# Patient Record
Sex: FEMALE | Race: WHITE | Employment: UNEMPLOYED | URBAN - METROPOLITAN AREA
[De-identification: names, ages, dates, MRNs, and addresses within clinical notes are randomized per-mention and may not be internally consistent; named-entity substitution may affect disease eponyms.]

---
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
## 2021-07-21 ENCOUNTER — OFFICE VISIT (OUTPATIENT)
Dept: URGENT CARE | Age: 21
End: 2021-07-21

## 2021-07-21 VITALS
SYSTOLIC BLOOD PRESSURE: 170 MMHG | RESPIRATION RATE: 20 BRPM | WEIGHT: 213 LBS | OXYGEN SATURATION: 98 % | BODY MASS INDEX: 33.36 KG/M2 | TEMPERATURE: 98.7 F | DIASTOLIC BLOOD PRESSURE: 89 MMHG | HEART RATE: 89 BPM

## 2021-07-21 DIAGNOSIS — Z86.59 HISTORY OF POSTTRAUMATIC STRESS DISORDER (PTSD): ICD-10-CM

## 2021-07-21 DIAGNOSIS — F41.9 ACUTE ANXIETY: ICD-10-CM

## 2021-07-21 DIAGNOSIS — R53.83 FATIGUE, UNSPECIFIED TYPE: Primary | ICD-10-CM

## 2021-07-21 LAB — SARS-COV-2 POC: NEGATIVE

## 2021-07-21 PROCEDURE — 96372 THER/PROPH/DIAG INJ SC/IM: CPT

## 2021-07-21 PROCEDURE — 99204 OFFICE O/P NEW MOD 45 MIN: CPT | Performed by: FAMILY MEDICINE

## 2021-07-21 PROCEDURE — 87426 SARSCOV CORONAVIRUS AG IA: CPT | Performed by: FAMILY MEDICINE

## 2021-07-21 RX ORDER — PAROXETINE HYDROCHLORIDE HEMIHYDRATE 25 MG/1
25 TABLET, FILM COATED, EXTENDED RELEASE ORAL DAILY
COMMUNITY

## 2021-07-21 RX ORDER — ONDANSETRON 2 MG/ML
4 INJECTION INTRAMUSCULAR; INTRAVENOUS ONCE
Status: COMPLETED | OUTPATIENT
Start: 2021-07-21 | End: 2021-07-21

## 2021-07-21 RX ORDER — HYDROXYZINE PAMOATE 25 MG/1
25 CAPSULE ORAL
Qty: 20 CAPSULE | Refills: 0 | Status: SHIPPED | OUTPATIENT
Start: 2021-07-21

## 2021-07-21 RX ORDER — ONDANSETRON 4 MG/1
4 TABLET, ORALLY DISINTEGRATING ORAL
Qty: 6 TABLET | Refills: 0 | Status: SHIPPED | OUTPATIENT
Start: 2021-07-21

## 2021-07-21 RX ORDER — PROPRANOLOL HYDROCHLORIDE 20 MG/1
20 TABLET ORAL
Qty: 20 TABLET | Refills: 0 | Status: SHIPPED | OUTPATIENT
Start: 2021-07-21

## 2021-07-21 RX ADMIN — ONDANSETRON 4 MG: 2 INJECTION INTRAMUSCULAR; INTRAVENOUS at 16:30

## 2021-07-21 NOTE — LETTER
NOTIFICATION RETURN TO WORK / SCHOOL    7/21/2021 5:12 PM    Ms. Desiree Perez  [address] [id number]      To Whom It May Concern:    Desiree Perez is currently under the care of [facility]. She will return to work/school on: 7/26/21    If there are questions or concerns please have the patient contact our office.         Sincerely,      Chio Whitley MD

## 2021-07-21 NOTE — PATIENT INSTRUCTIONS
Hyperventilation: Care Instructions  Your Care Instructions  Hyperventilation is breathing that is deeper and faster than normal. It causes the amount of carbon dioxide (CO2) in the blood to drop. This may make you feel lightheaded. You may also have a fast heartbeat and feel short of breath. It also can lead to numbness or tingling in the hands or feet, anxiety, fainting, and sore chest muscles. Some causes of sudden hyperventilation include anxiety, asthma, emphysema, a head injury, fever, and some medicines. Hyperventilation also may be caused by a pattern of incorrect breathing. It most often happens when a physical or emotional event makes this breathing pattern worse. Hyperventilation may happen during pregnancy. But it usually goes away on its own after delivery. In many cases, hyperventilation can be controlled by learning proper breathing techniques. Follow-up care is a key part of your treatment and safety. Be sure to make and go to all appointments, and call your doctor if you are having problems. It's also a good idea to know your test results and keep a list of the medicines you take. How can you care for yourself at home? Breathing methods  · Breathe through pursed lips, as if you are whistling. Or pinch one nostril and breathe through your nose. It is harder to hyperventilate through your nose or through pursed lips because you can't move as much air. · Slow your breathing to 1 breath every 5 seconds, or slow enough that symptoms gradually go away. · Try belly-breathing. This fills your lungs fully, slows your breathing rate, and helps you relax. ? Place one hand on your belly just below the ribs. Place the other hand on your chest. You can do this while standing, but it may be more comfortable while you lie on the floor with your knees bent. ? Take a deep breath through your nose. As you breathe in, let your belly push your hand out. Keep your chest still.   ? As you breathe out through pursed lips, feel your hand go down. Use the hand on your belly to help you push all the air out. Take your time breathing out. ? Repeat these steps 3 to 10 times. Take your time with each breath. Always try to control your breathing or to belly-breathe first. If these techniques don't work and you don't have other health problems, you might try breathing in and out of a paper bag. Using a paper bag  · Take 6 to 12 easy, natural breaths, with a small paper bag held over your mouth and nose. Then remove the bag from your nose and mouth, and take easy, natural breaths. · Next, try belly-breathing. · Switch between these techniques until your hyperventilation stops. Do not try this method if:  · You have any heart or lung problems. · Rapid breathing happens at a high altitude. Breathing faster than normal is a natural response to high altitude. Follow these safety measures when using this method:  · Do not use a plastic bag. · Do not breathe continuously into a paper bag. Take 6 to 12 natural breaths with a paper bag held over your mouth and nose. Then remove the bag from your nose and mouth. · Do not hold the bag for a person who is hyperventilating. Let the person hold the bag over his or her own mouth and nose. When should you call for help? Call 911 anytime you think you may need emergency care. For example, call if:    · You passed out (lost consciousness). Call your doctor now or seek immediate medical care if:    · You hyperventilate for longer than 30 minutes.     · You hyperventilate often.     · Your symptoms do not improve with home treatment.     · Your symptoms become more severe or more frequent. Watch closely for changes in your health, and be sure to contact your doctor if you have any problems. Where can you learn more? Go to http://www.gray.com/  Enter [id number] in the search box to learn more about \"Hyperventilation: Care Instructions. \"  Current as of: February 26, 2020               Content Version: 12.8  © 2006-2021 [employer]. Care instructions adapted under license by [employer] (which disclaims liability or warranty for this information). If you have questions about a medical condition or this instruction, always ask your healthcare professional. [address] any warranty or liability for your use of this information. Panic Attacks: Care Instructions  Overview    During a panic attack, you may have a feeling of intense fear or terror, trouble breathing, chest pain or tightness, heartbeat changes, dizziness, sweating, and shaking. A panic attack starts suddenly and usually lasts from 5 to 20 minutes but may last even longer. An attack can begin with a stressful event. Or it can happen without a cause. Although panic attacks can cause scary symptoms, you can learn to manage them with self-care, counseling, and medicine. Follow-up care is a key part of your treatment and safety. Be sure to make and go to all appointments, and call your doctor if you are having problems. It's also a good idea to know your test results and keep a list of the medicines you take. How can you care for yourself at home? · Take your medicine exactly as directed. Call your doctor if you think you are having a problem with your medicine. · Go to your counseling sessions and follow-up appointments. · Recognize and accept your anxiety. Then, when you are in a situation that makes you anxious, say to yourself, \"This is not an emergency. I feel uncomfortable, but I am not in danger. I can keep going even if I feel anxious. \"  · Be kind to your body:  ? Relieve tension with exercise or a massage. ? Get enough rest.  ? Avoid alcohol, caffeine, nicotine, and illegal drugs. They can increase your anxiety level, cause sleep problems, or trigger a panic attack. ? Learn and do relaxation techniques.  See below for more about these techniques. · Engage your mind. Get out and do something you enjoy. Go to a funny movie, or take a walk or hike. Plan your day. Having too much or too little to do can make you anxious. · Keep a record of your symptoms. Discuss your fears with a good friend or family member, or join a support group for people with similar problems. Talking to others sometimes relieves stress. · Get involved in social groups, or volunteer to help others. Being alone sometimes makes things seem worse than they are. · Get at least 30 minutes of exercise on most days of the week to relieve stress. Walking is a good choice. You also may want to do other activities, such as running, swimming, cycling, or playing tennis or team sports. Relaxation techniques  Do relaxation exercises for 10 to 20 minutes a day. You can play soothing, relaxing music while you do them, if you wish. · Tell others in your house that you are going to do your relaxation exercises. Ask them not to disturb you. · Find a comfortable place, away from all distractions and noise. · Lie down on your back, or sit with your back straight. · Focus on your breathing. Make it slow and steady. · Breathe in through your nose. Breathe out through either your nose or mouth. · Breathe deeply, filling up the area between your navel and your rib cage. Breathe so that your belly goes up and down. · Do not hold your breath. · Breathe like this for 5 to 10 minutes. Notice the feeling of calmness throughout your whole body. As you continue to breathe slowly and deeply, relax by doing the following for another 5 to 10 minutes:  · Tighten and relax each muscle group in your body. You can begin at your toes and work your way up to your head. · Imagine your muscle groups relaxing and becoming heavy. · Empty your mind of all thoughts. · Let yourself relax more and more deeply. · Become aware of the state of calmness that surrounds you.   · When your relaxation time is over, you can bring yourself back to alertness by moving your fingers and toes and then your hands and feet and then stretching and moving your entire body. Sometimes people fall asleep during relaxation, but they usually wake up shortly afterward. · Always give yourself time to return to full alertness before you drive a car or do anything that might cause an accident if you are not fully alert. Never play a relaxation tape while driving a car. When should you call for help? Call 911 anytime you think you may need emergency care. For example, call if:    · You feel you cannot stop from hurting yourself or someone else. Watch closely for changes in your health, and be sure to contact your doctor if:    · Your panic attacks get worse.     · You have new or different anxiety.     · You are not getting better as expected. Where can you learn more? Go to http://www.gray.com/  Enter H601 in the search box to learn more about \"Panic Attacks: Care Instructions. \"  Current as of: September 23, 2020               Content Version: 12.8  © 2006-2021 [employer]. Care instructions adapted under license by [employer] (which disclaims liability or warranty for this information). If you have questions about a medical condition or this instruction, always ask your healthcare professional. [address] any warranty or liability for your use of this information.

## 2021-07-21 NOTE — PROGRESS NOTES
Anxiety  This is a new problem. The current episode started 12 to 24 hours ago. The problem occurs constantly. The problem has not changed since onset. Associated symptoms include chest pain, headaches and shortness of breath. Associated symptoms comments: See ROS. The symptoms are aggravated by stress (her exacerbation of PTSD sxs). Nothing relieves the symptoms. Treatments tried: went to ED @ [facility] yesterday and given Iv ativan there and it helped. Past Medical History:   Diagnosis Date    Psychiatric disorder     depression and anxiety        History reviewed. No pertinent surgical history. Family History   Problem Relation Age of Onset    No Known Problems Mother     No Known Problems Father         Social History     Socioeconomic History    Marital status: SINGLE     Spouse name: Not on file    Number of children: Not on file    Years of education: Not on file    Highest education level: Not on file   Occupational History    Not on file   Tobacco Use    Smoking status: Never Smoker    Smokeless tobacco: Never Used   Substance and Sexual Activity    Alcohol use: Not on file    Drug use: Not on file    Sexual activity: Not on file   Other Topics Concern    Not on file   Social History Narrative    Not on file     Social Determinants of Health     Financial Resource Strain:     Difficulty of Paying Living Expenses:    Food Insecurity:     Worried About Running Out of Food in the Last Year:     [address] in the Last Year:    Transportation Needs:     Lack of Transportation (Medical):      Lack of Transportation (Non-Medical):    Physical Activity:     Days of Exercise per Week:     Minutes of Exercise per Session:    Stress:     Feeling of Stress :    Social Connections:     Frequency of Communication with Friends and Family:     Frequency of Social Gatherings with Friends and Family:     Attends Oriental orthodox Services:     Active Member of Clubs or Organizations:     Attends Club or Organization Meetings:     Marital Status:    Intimate Partner Violence:     Fear of Current or Ex-Partner:     Emotionally Abused:     Physically Abused:     Sexually Abused: ALLERGIES: Patient has no known allergies. Review of Systems   Constitutional: Positive for fatigue. Negative for chills and fever. Respiratory: Positive for chest tightness and shortness of breath. Negative for wheezing. Cardiovascular: Positive for chest pain and palpitations. Skin:        Cold hands and feet    Neurological: Positive for weakness (feels npo energy to do anything ), light-headedness, numbness and headaches. Psychiatric/Behavioral: Positive for sleep disturbance. The patient is nervous/anxious. Vitals:    07/21/21 1554   BP: (!) 170/89   Pulse: 89   Resp: 20   Temp: 98.7 °F (37.1 °C)   SpO2: 98%   Weight: 213 lb (96.6 kg)       Physical Exam  Vitals and nursing note reviewed. Constitutional:       General: She is not in acute distress. Appearance: Normal appearance. She is not ill-appearing, toxic-appearing or diaphoretic. HENT:      Right Ear: Tympanic membrane normal.      Left Ear: Tympanic membrane normal.      Nose: No congestion or rhinorrhea. Mouth/Throat:      Pharynx: No oropharyngeal exudate or posterior oropharyngeal erythema. Eyes:      Conjunctiva/sclera: Conjunctivae normal.   Cardiovascular:      Rate and Rhythm: Regular rhythm. Tachycardia present. Pulses: Normal pulses. Heart sounds: Normal heart sounds. Pulmonary:      Effort: Pulmonary effort is normal. No respiratory distress. Breath sounds: Normal breath sounds. No wheezing, rhonchi or rales. Chest:      Chest wall: No tenderness. Abdominal:      General: Abdomen is flat. Tenderness: There is no abdominal tenderness. Musculoskeletal:      Right lower leg: No edema. Left lower leg: No edema. Neurological:      General: No focal deficit present. Mental Status: She is alert and oriented to person, place, and time. Cranial Nerves: No cranial nerve deficit. Coordination: Coordination normal.      Gait: Abnormal gait: not tested in wheel chair due to generalized weakness- no neuro deficit    Psychiatric:         Attention and Perception: Attention normal.         Mood and Affect: Mood is anxious. Behavior: Behavior is cooperative. MDM    Procedures        ICD-10-CM ICD-9-CM    1. Fatigue, unspecified type  R53.83 780.79 AMB POC SARS-COV-2   2. Acute anxiety  F41.9 300.00     since yesterday    3. History of posttraumatic stress disorder (PTSD)  Z86.59 V11.8        felt better when talk with her explain about her symptoms and hyperventilation creating vicious cycle  Advised to breath in [paper bag @ home  Start meditation 10 min twice a day  Follow with PCP/ psychiatrist/ Therapist for erliest follow up personal/ virtual- may benefit with increasing paxil      She also feel much after getting IVF  Advised to rest and back to work from Monday       Medications Ordered Today   Medications    sodium chloride 0.9 % bolus infusion 1,000 mL    ondansetron (ZOFRAN) injection 4 mg    hydrOXYzine pamoate (VISTARIL) 25 mg capsule     Sig: Take 1 Capsule by mouth four (4) times daily as needed for Anxiety (Panic attack). Dispense:  20 Capsule     Refill:  0    propranoloL (INDERAL) 20 mg tablet     Sig: Take 1 Tablet by mouth four (4) times daily as needed (anxiety/ Palpitation). Dispense:  20 Tablet     Refill:  0    ondansetron (Zofran ODT) 4 mg disintegrating tablet     Sig: Take 1 Tablet by mouth every eight (8) hours as needed for Nausea. Dispense:  6 Tablet     Refill:  0     Results for orders placed or performed in visit on 07/21/21   AMB POC SARS-COV-2   Result Value Ref Range    SARS-COV-2 POC Negative Negative     The patients condition was discussed with the patient and they understand.   The patient is to follow up with primary care doctor. If signs and symptoms become worse the pt is to go to the ER. The patient is to take medications as prescribed.

## 2021-07-22 ENCOUNTER — PATIENT MESSAGE (OUTPATIENT)
Dept: URGENT CARE | Age: 21
End: 2021-07-22

## 2021-07-22 NOTE — TELEPHONE ENCOUNTER
Pt left v/m she wanted a copy of her covid results emailed to her. Returned patient call no answer, left msg that I sent her mychart activation to her text message.

## 2023-01-18 ENCOUNTER — OFFICE VISIT (OUTPATIENT)
Dept: PRIMARY CARE CLINIC | Age: 23
End: 2023-01-18

## 2023-01-18 VITALS
DIASTOLIC BLOOD PRESSURE: 66 MMHG | BODY MASS INDEX: 24.17 KG/M2 | HEIGHT: 67 IN | RESPIRATION RATE: 16 BRPM | TEMPERATURE: 98.9 F | OXYGEN SATURATION: 97 % | SYSTOLIC BLOOD PRESSURE: 113 MMHG | WEIGHT: 154 LBS | HEART RATE: 111 BPM

## 2023-01-18 DIAGNOSIS — B37.9 ANTIBIOTIC-INDUCED YEAST INFECTION: ICD-10-CM

## 2023-01-18 DIAGNOSIS — J18.9 WALKING PNEUMONIA: Primary | ICD-10-CM

## 2023-01-18 DIAGNOSIS — T36.95XA ANTIBIOTIC-INDUCED YEAST INFECTION: ICD-10-CM

## 2023-01-18 DIAGNOSIS — R68.89 FLU-LIKE SYMPTOMS: ICD-10-CM

## 2023-01-18 LAB
FLUAV+FLUBV AG NOSE QL IA.RAPID: NEGATIVE
FLUAV+FLUBV AG NOSE QL IA.RAPID: POSITIVE
VALID INTERNAL CONTROL?: YES

## 2023-01-18 PROCEDURE — 87804 INFLUENZA ASSAY W/OPTIC: CPT

## 2023-01-18 PROCEDURE — 99204 OFFICE O/P NEW MOD 45 MIN: CPT

## 2023-01-18 RX ORDER — PROMETHAZINE HYDROCHLORIDE AND DEXTROMETHORPHAN HYDROBROMIDE 6.25; 15 MG/5ML; MG/5ML
5 SYRUP ORAL
Qty: 120 ML | Refills: 0 | Status: SHIPPED | OUTPATIENT
Start: 2023-01-18 | End: 2023-01-25

## 2023-01-18 RX ORDER — FLUCONAZOLE 150 MG/1
150 TABLET ORAL DAILY
Qty: 1 TABLET | Refills: 0 | Status: SHIPPED | OUTPATIENT
Start: 2023-01-18 | End: 2023-01-19

## 2023-01-18 RX ORDER — DOXYCYCLINE 100 MG/1
100 CAPSULE ORAL 2 TIMES DAILY
Qty: 14 CAPSULE | Refills: 0 | Status: SHIPPED | OUTPATIENT
Start: 2023-01-18 | End: 2023-01-25

## 2023-01-18 RX ORDER — AMOXICILLIN 875 MG/1
875 TABLET, FILM COATED ORAL 2 TIMES DAILY
COMMUNITY
Start: 2023-01-10

## 2023-01-18 RX ORDER — METHYLPREDNISOLONE 4 MG/1
TABLET ORAL
Qty: 1 DOSE PACK | Refills: 0 | Status: SHIPPED | OUTPATIENT
Start: 2023-01-18

## 2023-01-18 NOTE — LETTER
NOTIFICATION RETURN TO WORK / SCHOOL    1/18/2023 7:15 PM    Ms. Godfrey Eisenmenger  [address] [address]      To Whom It May Concern:    Godfrey Eisenmenger is currently under the care of [facility]. She will return to work/school once 24 hours free of symptoms/fever. If there are questions or concerns please have the patient contact our office.         Sincerely,      Eloise Henson NP

## 2023-01-18 NOTE — PROGRESS NOTES
Chief Complaint   Patient presents with    Cold Symptoms     X1 week; seen 1/12/2023 dx sinus inf and given abx; congestion/cough/sore throat/body aches/fever(100)/chills/ears popping/headache; home covid today - neg; taking amox    Yeast Infection     HISTORY OF PRESENT ILLNESS  Manas Knott is a 25 y.o. female. Patient reports coughing that's keeping her up at night, nasal congestion, sore throat, pressure in ears x2 weeks that's worsening. Now having mild SOB, right lower ribcage pain, low grade fever/chills/sweats. [facility] and given amox[facility]. In addition to symptoms now having yeast infection that began after antibiotics. Reports taking mucinex (intermittently) with little improvement in symptoms. Review of Systems   Constitutional:  Positive for malaise/fatigue. Negative for chills and fever. HENT:  Positive for congestion, sinus pain and sore throat. Negative for ear discharge. Eyes: Negative. Respiratory:  Positive for cough, sputum production and shortness of breath. Cardiovascular: Negative. Gastrointestinal: Negative. Genitourinary: Negative. Musculoskeletal: Negative. Skin: Negative. Neurological:  Negative for loss of consciousness. Endo/Heme/Allergies: Negative. Psychiatric/Behavioral: Negative. Physical Exam  Constitutional:       Appearance: Normal appearance. She is normal weight. She is ill-appearing. HENT:      Head: Normocephalic. Right Ear: A middle ear effusion is present. Left Ear: A middle ear effusion is present. Nose: Congestion present. No rhinorrhea. Mouth/Throat:      Mouth: Mucous membranes are moist.      Pharynx: Posterior oropharyngeal erythema present. No oropharyngeal exudate. Eyes:      Pupils: Pupils are equal, round, and reactive to light. Cardiovascular:      Rate and Rhythm: Normal rate. Pulses: Normal pulses. Heart sounds: Normal heart sounds.    Pulmonary:      Effort: Pulmonary effort is normal.      Breath sounds: Examination of the right-lower field reveals decreased breath sounds. Decreased breath sounds present. Comments: Clear breath sounds throughout lungs, diminished sounds to right lower posterior lobe. Abdominal:      Palpations: Abdomen is soft. Musculoskeletal:         General: Normal range of motion. Cervical back: Normal range of motion and neck supple. Skin:     General: Skin is warm. Neurological:      General: No focal deficit present. Mental Status: She is alert and oriented to person, place, and time. Psychiatric:         Mood and Affect: Mood normal.         Behavior: Behavior normal.     Past Medical History:   Diagnosis Date    Psychiatric disorder     depression and anxiety     History reviewed. No pertinent surgical history. /66 (BP 1 Location: Left arm, BP Patient Position: Sitting)   Pulse (!) 111   Temp 98.9 °F (37.2 °C) (Temporal)   Resp 16   Ht 5' 7\" (1.702 m)   Wt 154 lb (69.9 kg)   SpO2 97%   BMI 24.12 kg/m²   Results for orders placed or performed in visit on 01/18/23   AMB POC WILLIE INFLUENZA A/B TEST   Result Value Ref Range    VALID INTERNAL CONTROL POC Yes     Influenza A Ag POC Positive (A) Negative    Influenza B Ag POC Negative Negative     ASSESSMENT and PLAN  1. Walking pneumonia  -     XR CHEST PA LAT; Future  -     doxycycline (VIBRAMYCIN) 100 mg capsule; Take 1 Capsule by mouth two (2) times a day for 7 days. , Normal, Disp-14 Capsule, R-0 - Reports amoxicillin has not helped symptoms of sinus pressure/pain. Advised to stop taking previous abx and start taking doxy. -     methylPREDNISolone (MEDROL DOSEPACK) 4 mg tablet; Take as directed., Normal, Disp-1 Dose Pack, R-0  -     promethazine-dextromethorphan (PROMETHAZINE-DM) 6.25-15 mg/5 mL syrup; Take 5 mL by mouth every four (4) hours as needed for Cough for up to 7 days. , Normal, Disp-120 mL, R-0  - Patient passed ambulatory pulse ox remained above >95%.   - Advised patient to be evaluated in ER,patient declined. Discussed if symptoms does not improve in the next 24 hours to go to ER. Patient in agreement with plan  2. Flu-like symptoms  -     AMB POC WILLIE INFLUENZA A/B TEST   - Rest/push fluids  3. Antibiotic-induced yeast infection  -     fluconazole (DIFLUCAN) 150 mg tablet; Take 1 Tablet by mouth daily for 1 day. FDA advises cautious prescribing of oral fluconazole in pregnancy. , Normal, Disp-1 Tablet, R-0    Eloise Henson NP

## 2023-01-18 NOTE — PROGRESS NOTES
Identified pt with two pt identifiers(name and ). Reviewed record in preparation for visit and have obtained necessary documentation. Chief Complaint   Patient presents with    Cold Symptoms     X1 week; seen 2023 dx sinus inf and given abx; congestion/cough/sore throat/body aches/fever(100)/chills/ears popping/headache; home covid today - neg; taking amox    Yeast Infection      Vitals:    23 1839   BP: 113/66   Pulse: (!) 111   Resp: 16   Temp: 98.9 °F (37.2 °C)   TempSrc: Temporal   SpO2: 97%   Weight: 154 lb (69.9 kg)   Height: 5' 7\" (1.702 m)   PainSc:   5   PainLoc: Generalized       Health Maintenance Review: Patient reminded of \"due or due soon\" health maintenance. I have asked the patient to contact his/her primary care provider (PCP) for follow-up on his/her health maintenance. Coordination of Care Questionnaire:  :   1) Have you been to an emergency room, urgent care, or hospitalized since your last visit? If yes, where when, and reason for visit? no       2. Have seen or consulted any other health care provider since your last visit? If yes, where when, and reason for visit? NO      Patient is accompanied by self I have received verbal consent from Manas Knott to discuss any/all medical information while they are present in the room.

## 2023-01-19 NOTE — PATIENT INSTRUCTIONS
- Nasacort as directed  - Tylenol only for pain/discomfort. - rest/ push fluids  - Do not take NSAIDS (motrin, aleve, naproxen etc.) while on steroids. Take steroids in the morning, will keep you up at night.

## 2023-01-24 ENCOUNTER — OFFICE VISIT (OUTPATIENT)
Dept: PRIMARY CARE CLINIC | Age: 23
End: 2023-01-24

## 2023-01-24 ENCOUNTER — APPOINTMENT (OUTPATIENT)
Dept: CT IMAGING | Age: 23
End: 2023-01-24
Attending: STUDENT IN AN ORGANIZED HEALTH CARE EDUCATION/TRAINING PROGRAM
Payer: COMMERCIAL

## 2023-01-24 ENCOUNTER — HOSPITAL ENCOUNTER (EMERGENCY)
Age: 23
Discharge: HOME | End: 2023-01-25
Attending: STUDENT IN AN ORGANIZED HEALTH CARE EDUCATION/TRAINING PROGRAM
Payer: COMMERCIAL

## 2023-01-24 ENCOUNTER — APPOINTMENT (OUTPATIENT)
Dept: GENERAL RADIOLOGY | Age: 23
End: 2023-01-24
Attending: STUDENT IN AN ORGANIZED HEALTH CARE EDUCATION/TRAINING PROGRAM
Payer: COMMERCIAL

## 2023-01-24 VITALS
OXYGEN SATURATION: 98 % | SYSTOLIC BLOOD PRESSURE: 102 MMHG | WEIGHT: 148.4 LBS | RESPIRATION RATE: 18 BRPM | BODY MASS INDEX: 23.29 KG/M2 | DIASTOLIC BLOOD PRESSURE: 70 MMHG | TEMPERATURE: 96.5 F | HEART RATE: 95 BPM | HEIGHT: 67 IN

## 2023-01-24 VITALS
OXYGEN SATURATION: 100 % | WEIGHT: 146 LBS | SYSTOLIC BLOOD PRESSURE: 120 MMHG | RESPIRATION RATE: 16 BRPM | DIASTOLIC BLOOD PRESSURE: 77 MMHG | HEIGHT: 66 IN | BODY MASS INDEX: 23.46 KG/M2 | HEART RATE: 84 BPM | TEMPERATURE: 97.2 F

## 2023-01-24 DIAGNOSIS — R91.8 PULMONARY NODULES: ICD-10-CM

## 2023-01-24 DIAGNOSIS — J20.9 ACUTE BRONCHITIS, UNSPECIFIED ORGANISM: Primary | ICD-10-CM

## 2023-01-24 DIAGNOSIS — R55 VASOVAGAL SYNCOPE: ICD-10-CM

## 2023-01-24 DIAGNOSIS — R06.02 SOB (SHORTNESS OF BREATH): Primary | ICD-10-CM

## 2023-01-24 LAB
ALBUMIN SERPL-MCNC: 3.7 G/DL (ref 3.5–5)
ALBUMIN/GLOB SERPL: 0.9 (ref 1.1–2.2)
ALP SERPL-CCNC: 52 U/L (ref 45–117)
ALT SERPL-CCNC: 37 U/L (ref 12–78)
ANION GAP SERPL CALC-SCNC: 8 MMOL/L (ref 5–15)
AST SERPL-CCNC: 57 U/L (ref 15–37)
BASOPHILS # BLD: 0 K/UL (ref 0–0.1)
BASOPHILS NFR BLD: 0 % (ref 0–1)
BILIRUB SERPL-MCNC: 0.7 MG/DL (ref 0.2–1)
BNP SERPL-MCNC: 34 PG/ML
BUN SERPL-MCNC: 13 MG/DL (ref 6–20)
BUN/CREAT SERPL: 16 (ref 12–20)
CALCIUM SERPL-MCNC: 9.1 MG/DL (ref 8.5–10.1)
CHLORIDE SERPL-SCNC: 108 MMOL/L (ref 97–108)
CO2 SERPL-SCNC: 20 MMOL/L (ref 21–32)
CREAT SERPL-MCNC: 0.82 MG/DL (ref 0.55–1.02)
DIFFERENTIAL METHOD BLD: ABNORMAL
EOSINOPHIL # BLD: 0 K/UL (ref 0–0.4)
EOSINOPHIL NFR BLD: 0 % (ref 0–7)
ERYTHROCYTE [DISTWIDTH] IN BLOOD BY AUTOMATED COUNT: 13.3 % (ref 11.5–14.5)
GLOBULIN SER CALC-MCNC: 3.9 G/DL (ref 2–4)
GLUCOSE SERPL-MCNC: 98 MG/DL (ref 65–100)
HCG UR QL: NEGATIVE
HCT VFR BLD AUTO: 41.3 % (ref 35–47)
HGB BLD-MCNC: 13.9 G/DL (ref 11.5–16)
IMM GRANULOCYTES # BLD AUTO: 0 K/UL (ref 0–0.04)
IMM GRANULOCYTES NFR BLD AUTO: 0 % (ref 0–0.5)
LYMPHOCYTES # BLD: 1.4 K/UL (ref 0.8–3.5)
LYMPHOCYTES NFR BLD: 29 % (ref 12–49)
MCH RBC QN AUTO: 28.5 PG (ref 26–34)
MCHC RBC AUTO-ENTMCNC: 33.7 G/DL (ref 30–36.5)
MCV RBC AUTO: 84.8 FL (ref 80–99)
MONOCYTES # BLD: 1 K/UL (ref 0–1)
MONOCYTES NFR BLD: 21 % (ref 5–13)
NEUTS SEG # BLD: 2.4 K/UL (ref 1.8–8)
NEUTS SEG NFR BLD: 50 % (ref 32–75)
NRBC # BLD: 0 K/UL (ref 0–0.01)
NRBC BLD-RTO: 0 PER 100 WBC
PLATELET # BLD AUTO: 215 K/UL (ref 150–400)
PMV BLD AUTO: 10.2 FL (ref 8.9–12.9)
POTASSIUM SERPL-SCNC: 4.8 MMOL/L (ref 3.5–5.1)
PROT SERPL-MCNC: 7.6 G/DL (ref 6.4–8.2)
RBC # BLD AUTO: 4.87 M/UL (ref 3.8–5.2)
RBC MORPH BLD: ABNORMAL
SODIUM SERPL-SCNC: 136 MMOL/L (ref 136–145)
TROPONIN-HIGH SENSITIVITY: <4 NG/L (ref 0–51)
WBC # BLD AUTO: 4.8 K/UL (ref 3.6–11)

## 2023-01-24 PROCEDURE — 36415 COLL VENOUS BLD VENIPUNCTURE: CPT

## 2023-01-24 PROCEDURE — 83880 ASSAY OF NATRIURETIC PEPTIDE: CPT

## 2023-01-24 PROCEDURE — 74011250637 HC RX REV CODE- 250/637: Performed by: STUDENT IN AN ORGANIZED HEALTH CARE EDUCATION/TRAINING PROGRAM

## 2023-01-24 PROCEDURE — 84484 ASSAY OF TROPONIN QUANT: CPT

## 2023-01-24 PROCEDURE — 71275 CT ANGIOGRAPHY CHEST: CPT

## 2023-01-24 PROCEDURE — 85025 COMPLETE CBC W/AUTO DIFF WBC: CPT

## 2023-01-24 PROCEDURE — 80053 COMPREHEN METABOLIC PANEL: CPT

## 2023-01-24 PROCEDURE — 99212 OFFICE O/P EST SF 10 MIN: CPT

## 2023-01-24 PROCEDURE — 74011000636 HC RX REV CODE- 636: Performed by: STUDENT IN AN ORGANIZED HEALTH CARE EDUCATION/TRAINING PROGRAM

## 2023-01-24 PROCEDURE — 94640 AIRWAY INHALATION TREATMENT: CPT

## 2023-01-24 PROCEDURE — 71046 X-RAY EXAM CHEST 2 VIEWS: CPT

## 2023-01-24 PROCEDURE — 99285 EMERGENCY DEPT VISIT HI MDM: CPT

## 2023-01-24 PROCEDURE — 93005 ELECTROCARDIOGRAM TRACING: CPT

## 2023-01-24 PROCEDURE — 81025 URINE PREGNANCY TEST: CPT

## 2023-01-24 RX ORDER — ALBUTEROL SULFATE 90 UG/1
2 AEROSOL, METERED RESPIRATORY (INHALATION)
Status: COMPLETED | OUTPATIENT
Start: 2023-01-24 | End: 2023-01-24

## 2023-01-24 RX ADMIN — IOPAMIDOL 100 ML: 755 INJECTION, SOLUTION INTRAVENOUS at 22:19

## 2023-01-24 RX ADMIN — ALBUTEROL SULFATE 2 PUFF: 90 AEROSOL, METERED RESPIRATORY (INHALATION) at 21:56

## 2023-01-24 NOTE — ED TRIAGE NOTES
Pt c/o shortness of breath that is worsening since dx of PNA. Dx PNA last Wednesday. Pt reports syncope during increased activity today. Pt was referred from urgent care for PE evaluation.

## 2023-01-24 NOTE — PROGRESS NOTES
Identified pt with two pt identifiers(name and ). Reviewed record in preparation for visit and have obtained necessary documentation. Chief Complaint   Patient presents with    Cough     X2 weeks; productive - yellow; coughing fits causing lightheadedness and tingling in limbs      Vitals:    23 1649   BP: 102/70   Pulse: 95   Resp: 18   Temp: (!) 96.5 °F (35.8 °C)   TempSrc: Temporal   SpO2: 98%   Weight: 148 lb 6.4 oz (67.3 kg)   Height: 5' 7\" (1.702 m)   PainSc:   0 - No pain       Health Maintenance Review: Patient reminded of \"due or due soon\" health maintenance. I have asked the patient to contact his/her primary care provider (PCP) for follow-up on his/her health maintenance. Coordination of Care Questionnaire:  :   1) Have you been to an emergency room, urgent care, or hospitalized since your last visit? If yes, where when, and reason for visit? no       2. Have seen or consulted any other health care provider since your last visit? If yes, where when, and reason for visit? NO      Patient is accompanied by self I have received verbal consent from Flaco Watts to discuss any/all medical information while they are present in the room.

## 2023-01-24 NOTE — PROGRESS NOTES
Chief Complaint   Patient presents with    Cough     X2 weeks; productive - yellow; coughing fits causing lightheadedness and tingling in limbs     HISTORY OF PRESENT ILLNESS  Mag Aguilar is a 21 y.o. female. Patient seen by me and treated for walking pneumonia and influenza A x 6 days ago. Prior to this was seen at [facility] and treated with azithromycin for sinus infection x 2-3 weeks ago. Patient returns today reporting she was carrying boxes up two flight of stairs and had tunnel vision, unsure if loss consciousness but was unable to catch her breathe as she felt very winded. Review of Systems   Constitutional:  Positive for malaise/fatigue. Negative for chills and fever. HENT: Negative. Eyes: Negative. Respiratory:  Positive for cough and shortness of breath. Cardiovascular: Negative. Gastrointestinal: Negative. Genitourinary: Negative. Musculoskeletal: Negative. Skin: Negative. Neurological:  Positive for dizziness. Negative for loss of consciousness. Endo/Heme/Allergies: Negative. Psychiatric/Behavioral: Negative. Physical Exam  Pulmonary:      Breath sounds: Normal breath sounds. Comments: Pt in tripod position, she is able to maintain air way and speaks in full sentences although is exhausted by the end of each sentence. Past Medical History:   Diagnosis Date    Psychiatric disorder     depression and anxiety     History reviewed. No pertinent surgical history. /70 (BP 1 Location: Left arm, BP Patient Position: Sitting)   Pulse 95   Temp (!) 96.5 °F (35.8 °C) (Temporal)   Resp 18   Ht 5' 7\" (1.702 m)   Wt 148 lb 6.4 oz (67.3 kg)   SpO2 98%   BMI 23.24 kg/m²     ASSESSMENT and PLAN  1. SOB (shortness of breath)  Advised to go to ER for further work up, possible PE. Patient sister at bedside and will transport patient, patient declined EMS transport. Attempted to call ER multiple times, unable to reach staff.   Maynor Robb NP

## 2023-01-24 NOTE — Clinical Note
[address]  \[facility]\"" EMERGENCY DEPT  [address] [place] [address]  Yolanda Krueger [id number] [phone]    Work/School Note    Date: 1/24/2023    To Whom It May concern:    Cuco Dexter was seen and treated today in the emergency room by the following provider(s):  Attending Provider: Jayne Mallory MD.      Cuco Dexter is excused from work/school on 01/24/23 and 01/25/23. She is medically clear to return to work/school on 1/26/2023.        Sincerely,          Casper Osuna MD

## 2023-01-25 LAB
ATRIAL RATE: 74 BPM
CALCULATED P AXIS, ECG09: 68 DEGREES
CALCULATED R AXIS, ECG10: 76 DEGREES
CALCULATED T AXIS, ECG11: 64 DEGREES
DIAGNOSIS, 93000: NORMAL
P-R INTERVAL, ECG05: 124 MS
Q-T INTERVAL, ECG07: 388 MS
QRS DURATION, ECG06: 88 MS
QTC CALCULATION (BEZET), ECG08: 430 MS
VENTRICULAR RATE, ECG03: 74 BPM

## 2023-01-25 NOTE — PROGRESS NOTES
Patient does not have a documented line for CT scan. Attempted to call pod but no response. Please call CT department when patient has IV.

## 2023-01-25 NOTE — ED PROVIDER NOTES
[facility] EMERGENCY DEPT  EMERGENCY DEPARTMENT ENCOUNTER       Pt Name: Cherise Loera  MRN: [id number]  [place] 2000  Date of evaluation: 1/24/2023  Provider: Marlin Adams MD   PCP: Clovis Halsted, MD  Note Started: 9:21 PM 1/24/23     CHIEF COMPLAINT       Chief Complaint   Patient presents with    Shortness of Breath        HISTORY OF PRESENT ILLNESS: 1 or more elements    History From: Patient  HPI Limitations : None     Cherise oLera is a 21 y.o. female with Pmhx listed below     Patient is a 12-year-old female with history of depression and anxiety presenting with concern of cough, syncopal episode today. Patient states that she has had ongoing cough, intermittent chest pain, pain with breathing and passing out episode today. Patient states that she has had other symptoms x2 to 3 weeks, states that she has been seen and evaluated multiple times for cough, diagnosed with possible pneumonia, has completed doxycycline and amoxicillin and continues to have cough, patient states that today she was going up the stairs, had a coughing episode and passed out, states that she did not hit her head and return to baseline quickly, denies any bowel or bladder incontinence, patient states that she has had passing out episodes in the past but given ongoing shortness of breath and pleuritic chest pain went to [facility] and [facility] her to [facility]. Patient states that she is otherwise been in her usual state of health before, otherwise healthy, no medication daily, denies estrogen, denies any leg swelling, complaints this time. Nursing Notes were all reviewed and agreed with or any disagreements were addressed in the HPI. REVIEW OF SYSTEMS      Positives and Pertinent negatives as per HPI.     PAST HISTORY     Past Medical History:  Past Medical History:   Diagnosis Date    Psychiatric disorder     depression and anxiety     Previous Medications    AMOXICILLIN (AMOXIL) 875 MG TABLET    Take 875 mg by mouth two (2) times a day. DOXYCYCLINE (VIBRAMYCIN) 100 MG CAPSULE    Take 1 Capsule by mouth two (2) times a day for 7 days. HYDROXYZINE PAMOATE (VISTARIL) 25 MG CAPSULE    Take 1 Capsule by mouth four (4) times daily as needed for Anxiety (Panic attack). METHYLPREDNISOLONE (MEDROL DOSEPACK) 4 MG TABLET    Take as directed. MULTIVITAMIN PO    Take 1 Tab by mouth daily. DEDRICK, 28, 3-0.02 MG TAB    TAKE 1 TABLET BY MOUTH EVERY DAY    ONDANSETRON (ZOFRAN ODT) 4 MG DISINTEGRATING TABLET    Take 1 Tablet by mouth every eight (8) hours as needed for Nausea. PAROXETINE CR (PAXIL-CR) 25 MG TABLET    Take 25 mg by mouth daily. PROMETHAZINE-DEXTROMETHORPHAN (PROMETHAZINE-DM) 6.25-15 MG/5 ML SYRUP    Take 5 mL by mouth every four (4) hours as needed for Cough for up to 7 days. PROPRANOLOL (INDERAL) 20 MG TABLET    Take 1 Tablet by mouth four (4) times daily as needed (anxiety/ Palpitation). RETIN-A MICRO PUMP 0.06 % GLWP    APPLY TO AFFECTED AREA EVERY OTHER NIGHT. INCREASE TO NIGHTLY IF TOLERABLE. CAN MOISTURIZE OVER    SERTRALINE (ZOLOFT) 100 MG TABLET    Take 100 mg by mouth daily. Past Surgical History:  No past surgical history on file. Family History:  Family History   Problem Relation Age of Onset    No Known Problems Mother     No Known Problems Father        Social History:  Social History     Tobacco Use    Smoking status: Never    Smokeless tobacco: Never       Allergies:  No Known Allergies    PHYSICAL EXAM      ED Triage Vitals [01/24/23 1720]   ED Encounter Vitals Group      /77      Pulse (Heart Rate) 84      Resp Rate 16      Temp 97.2 °F (36.2 °C)      Temp src       O2 Sat (%) 100 %      Weight 146 lb      Height 5' 6\"        Physical Exam  Vitals reviewed. Constitutional:       General: She is not in acute distress. Appearance: Normal appearance. She is not ill-appearing, toxic-appearing or diaphoretic. HENT:      Head: Normocephalic.       Mouth/Throat: Mouth: Mucous membranes are dry. Eyes:      Conjunctiva/sclera: Conjunctivae normal.   Cardiovascular:      Rate and Rhythm: Normal rate. Heart sounds: No murmur heard. Pulmonary:      Effort: Pulmonary effort is normal. No respiratory distress. Breath sounds: No decreased breath sounds, wheezing, rhonchi or rales. Abdominal:      General: Abdomen is flat. Musculoskeletal:         General: No deformity. Cervical back: Neck supple. Right lower leg: No edema. Left lower leg: No edema. Skin:     General: Skin is warm and dry. Neurological:      General: No focal deficit present. Mental Status: She is alert. Psychiatric:         Mood and Affect: Mood normal.        EMERGENCY DEPARTMENT COURSE and DIFFERENTIAL DIAGNOSIS/MDM   CC/HPI Summary, DDx, ED Course, and Reassessment:     MDM  Number of Diagnoses or Management Options  Diagnosis management comments: Is a well-appearing 24-year-old female presenting with concern of syncopal episode today. Patient states that she has had 2 to 3 weeks of severe cough, completed antibiotics for possible pneumonia, states that she continues to cough and today had episode where she coughed significantly and then had a passing out episode, patient endorsed passing out episodes in the past but never this severe, typically with bending over or moving around. Patient denies any early cardiac family history, no history of sudden death in the family, patient has had no significant chest pain prior to the event but does state that she has had some chest pain with coughing recently and taking big deep breath. Patient was seen and evaluated at outpatient facility and referred to emergency room for CT PE study. Do feel that CT chest would be indicated today given pleuritic chest pain as well as syncopal episode, will also evaluate for ongoing pneumonia but higher suspicion for acute bronchitis with multiple weeks of symptoms.   Patient was stable, no apparent distress vital signs within normal limits, non hypoxic, no tachycardia, no leg swelling, lower suspicion for PE at this time, higher systemic or vasovagal syncope especially with history of the same, given the patient has had multiple syncopal episodes over the course of her life feel that a cardiac evaluation would be warranted upon discharge , will obtain CTA to evaluate for pneumonia versus PE, continue to monitor on cardiac monitor and plan on DC with close follow-up if stable.              Vitals:    01/24/23 1720   BP: 120/77   Pulse: 84   Resp: 16   Temp: 97.2 °F (36.2 °C)   SpO2: 100%   Weight: 66.2 kg (146 lb)   Height: 5' 6\" (1.676 m)        Patient was given the following medications:  Medications   albuterol (PROVENTIL HFA, VENTOLIN HFA, PROAIR HFA) inhaler 2 Puff (has no administration in time range)       CONSULTS:  None    Social Determinants affecting Dx or Tx: None    Records Reviewed (source and summary of external notes): Nursing Notes  DIAGNOSTIC RESULTS   LABS:     Recent Results (from the past 12 hour(s))   EKG, 12 LEAD, INITIAL    Collection Time: 01/24/23  5:30 PM   Result Value Ref Range    Ventricular Rate 74 BPM    Atrial Rate 74 BPM    P-R Interval 124 ms    QRS Duration 88 ms    Q-T Interval 388 ms    QTC Calculation (Bezet) 430 ms    Calculated P Axis 68 degrees    Calculated R Axis 76 degrees    Calculated T Axis 64 degrees    Diagnosis       Normal sinus rhythm with sinus arrhythmia  Normal ECG  No previous ECGs available     METABOLIC PANEL, COMPREHENSIVE    Collection Time: 01/24/23  6:15 PM   Result Value Ref Range    Sodium 136 136 - 145 mmol/L    Potassium 4.8 3.5 - 5.1 mmol/L    Chloride 108 97 - 108 mmol/L    CO2 20 (L) 21 - 32 mmol/L    Anion gap 8 5 - 15 mmol/L    Glucose 98 65 - 100 mg/dL    BUN 13 6 - 20 MG/DL    Creatinine 0.82 0.55 - 1.02 MG/DL    BUN/Creatinine ratio 16 12 - 20      eGFR >60 >60 ml/min/1.73m2    Calcium 9.1 8.5 - 10.1 MG/DL Bilirubin, total 0.7 0.2 - 1.0 MG/DL    ALT (SGPT) 37 12 - 78 U/L    AST (SGOT) 57 (H) 15 - 37 U/L    Alk. phosphatase 52 45 - 117 U/L    Protein, total 7.6 6.4 - 8.2 g/dL    Albumin 3.7 3.5 - 5.0 g/dL    Globulin 3.9 2.0 - 4.0 g/dL    A-G Ratio 0.9 (L) 1.1 - 2.2     TROPONIN-HIGH SENSITIVITY    Collection Time: 01/24/23  6:15 PM   Result Value Ref Range    Troponin-High Sensitivity <4 0 - 51 ng/L   NT-PRO BNP    Collection Time: 01/24/23  6:15 PM   Result Value Ref Range    NT pro-BNP 34 <125 PG/ML   CBC WITH AUTOMATED DIFF    Collection Time: 01/24/23  8:07 PM   Result Value Ref Range    WBC 4.8 3.6 - 11.0 K/uL    RBC 4.87 3.80 - 5.20 M/uL    HGB 13.9 11.5 - 16.0 g/dL    HCT 41.3 35.0 - 47.0 %    MCV 84.8 80.0 - 99.0 FL    MCH 28.5 26.0 - 34.0 PG    MCHC 33.7 30.0 - 36.5 g/dL    RDW 13.3 11.5 - 14.5 %    PLATELET 165 829 - 787 K/uL    MPV 10.2 8.9 - 12.9 FL    NRBC 0.0 0  WBC    ABSOLUTE NRBC 0.00 0.00 - 0.01 K/uL    NEUTROPHILS 50 32 - 75 %    LYMPHOCYTES 29 12 - 49 %    MONOCYTES 21 (H) 5 - 13 %    EOSINOPHILS 0 0 - 7 %    BASOPHILS 0 0 - 1 %    IMMATURE GRANULOCYTES 0 0.0 - 0.5 %    ABS. NEUTROPHILS 2.4 1.8 - 8.0 K/UL    ABS. LYMPHOCYTES 1.4 0.8 - 3.5 K/UL    ABS. MONOCYTES 1.0 0.0 - 1.0 K/UL    ABS. EOSINOPHILS 0.0 0.0 - 0.4 K/UL    ABS. BASOPHILS 0.0 0.0 - 0.1 K/UL    ABS. IMM.  GRANS. 0.0 0.00 - 0.04 K/UL    DF MANUAL      RBC COMMENTS NORMOCYTIC, NORMOCHROMIC     HCG URINE, QL. - POC    Collection Time: 01/24/23  8:30 PM   Result Value Ref Range    Pregnancy test,urine (POC) Negative NEG          EKG: NSR     RADIOLOGY:  Non-plain film images such as CT, Ultrasound and MRI are read by the radiologist.   Plain radiographic images are often visualized and preliminarily interpreted by the ED, if an interpretation was completed the findings will be listed below:        Interpretation per the Radiologist below, if available at the time of this note:     XR CHEST PA LAT    Result Date: 1/24/2023  EXAM: XR CHEST PA LAT INDICATION: Shortness of breath COMPARISON: January 18 TECHNIQUE: PA and lateral chest views FINDINGS: The cardiac size is within normal limits. The pulmonary vasculature is within normal limits. The lungs and pleural spaces are clear. The visualized bones and upper abdomen are age-appropriate. Normal PA and lateral chest views. PROCEDURES   Unless otherwise noted below, none  Procedures     CRITICAL CARE TIME       FINAL IMPRESSION     1. Acute bronchitis, unspecified organism    2. Vasovagal syncope          DISPOSITION/PLAN   Discharged    Discharge Note: The patient is stable for discharge home. The signs, symptoms, diagnosis, and discharge instructions have been discussed, understanding conveyed, and agreed upon. The patient is to follow up as recommended or return to ER should their symptoms worsen. PATIENT REFERRED TO:  Follow-up Information       Follow up With Specialties Details Why Contact Info    Naseem Amador MD Family Medicine Schedule an appointment as soon as possible for a visit in 1 week  [address] [facility] [address]  [phone]      [facility] EMERGENCY DEPT Emergency Medicine  If symptoms worsen [address]   [phone] [facility]    [address]  [address]              DISCHARGE MEDICATIONS:  Current Discharge Medication List            DISCONTINUED MEDICATIONS:  Current Discharge Medication List          I am the Primary Clinician of Record. Signed By: Federico Lombard, MD     January 24, 2023      (Please note that parts of this dictation were completed with voice recognition software. Quite often unanticipated grammatical, syntax, homophones, and other interpretive errors are inadvertently transcribed by the computer software. Please disregards these errors.  Please excuse any errors that have escaped final proofreading.)

## 2023-01-25 NOTE — DISCHARGE INSTRUCTIONS
Call cardiologist listed above for passing out episode today. Please continue monitor symptoms at home and return to ER if you develop any new or worsening symptoms such as chest pain, shortness of breath or new passing out episodes. Please begin using albuterol inhaler as needed for ongoing cough and shortness of breath with suspicion of acute bronchitis today. Please follow-up with your primary care doctor in 1 week for reevaluation.

## 2023-10-16 ENCOUNTER — OFFICE VISIT (OUTPATIENT)
Age: 23
End: 2023-10-16

## 2023-10-16 VITALS
SYSTOLIC BLOOD PRESSURE: 135 MMHG | DIASTOLIC BLOOD PRESSURE: 80 MMHG | BODY MASS INDEX: 23.7 KG/M2 | RESPIRATION RATE: 16 BRPM | WEIGHT: 151 LBS | HEIGHT: 67 IN | HEART RATE: 91 BPM | TEMPERATURE: 98.9 F | OXYGEN SATURATION: 98 %

## 2023-10-16 DIAGNOSIS — J02.9 SORE THROAT: ICD-10-CM

## 2023-10-16 DIAGNOSIS — B96.89 ACUTE BACTERIAL TONSILLITIS: Primary | ICD-10-CM

## 2023-10-16 DIAGNOSIS — J03.80 ACUTE BACTERIAL TONSILLITIS: Primary | ICD-10-CM

## 2023-10-16 LAB
MONONUCLEOSIS SCREEN POC: NEGATIVE
STREP PYOGENES DNA, POC: NEGATIVE
VALID INTERNAL CONTROL, POC: YES
VALID INTERNAL CONTROL: YES

## 2023-10-16 RX ORDER — AMOXICILLIN 500 MG/1
500 CAPSULE ORAL 2 TIMES DAILY
Qty: 20 CAPSULE | Refills: 0 | Status: SHIPPED | OUTPATIENT
Start: 2023-10-16 | End: 2023-10-26

## 2023-10-16 ASSESSMENT — ENCOUNTER SYMPTOMS: SORE THROAT: 1

## 2023-10-16 NOTE — PROGRESS NOTES
Sunshine Pierce (:  2000) is a 21 y.o. female,Established patient, here for evaluation of the following chief complaint(s):  Head Congestion (Pt reports having head congestion with a sore throat since Friday 10/13/23. Pt reports using Dyquil with no relief. )      ASSESSMENT/PLAN:    1. Acute bacterial tonsillitis  - amoxicillin (AMOXIL) 500 MG capsule; Take 1 capsule by mouth 2 times daily for 10 days  Dispense: 20 capsule; Refill: 0  - Discussed with patient typical tonsillitis course. Recommended: rest, push fluids, immune boosting vitamins and take OTC tylenol or ibuprofen for fever or symptom relief as needed. Take rx as prescribed. Additionally encouraged salt water gargles. Work note provided. Instructed to seek additional care if does not resolve or symptoms worsens. 2. Sore throat  - AMB POC STREP GO A DIRECT, DNA PROBE  - POC MONONUCLEOSIS SCREEN         SUBJECTIVE/OBJECTIVE:  21 y.o. female presents with symptoms of URI   This is a new problem. Episode onset: x 5 days. There has been no fever. Associated symptoms include headaches and a sore throat. Associated symptoms comments: And purulent mucus. She has tried decongestant, increased fluids and sleep (dayquil/nyquil) for the symptoms. The treatment provided mild relief. Physical Exam  Constitutional:       Appearance: Normal appearance. She is well-developed and well-groomed. She is not ill-appearing. HENT:      Head: Normocephalic. Right Ear: Tympanic membrane normal.      Left Ear: Tympanic membrane normal.      Nose: Nose normal.      Mouth/Throat:      Mouth: Mucous membranes are moist.      Pharynx: Oropharyngeal exudate and posterior oropharyngeal erythema present. Tonsils: Tonsillar exudate present. 1+ on the right. 1+ on the left. Cardiovascular:      Rate and Rhythm: Normal rate. Pulses: Normal pulses. Heart sounds: Normal heart sounds.    Pulmonary:      Effort: Pulmonary effort is normal.      Breath

## 2023-10-16 NOTE — PATIENT INSTRUCTIONS
- rest/push fluids  - salt water gargles  - take OTC tylenol/ibuprofen for fever or symptoms relief as needed.   - take antibiotics with food to minimize stomach upset  - Use OTC immune boosting vitamins to keep your immunity up ([employer] 24 hours support)

## 2024-03-12 ENCOUNTER — OFFICE VISIT (OUTPATIENT)
Age: 24
End: 2024-03-12
Payer: COMMERCIAL

## 2024-03-12 VITALS
TEMPERATURE: 98 F | WEIGHT: 166.2 LBS | OXYGEN SATURATION: 95 % | RESPIRATION RATE: 16 BRPM | HEART RATE: 82 BPM | BODY MASS INDEX: 26.09 KG/M2 | DIASTOLIC BLOOD PRESSURE: 67 MMHG | HEIGHT: 67 IN | SYSTOLIC BLOOD PRESSURE: 101 MMHG

## 2024-03-12 DIAGNOSIS — K43.9 VENTRAL HERNIA WITHOUT OBSTRUCTION OR GANGRENE: Primary | ICD-10-CM

## 2024-03-12 PROCEDURE — 99204 OFFICE O/P NEW MOD 45 MIN: CPT | Performed by: SURGERY

## 2024-03-12 RX ORDER — CYCLOBENZAPRINE HCL 5 MG
10 TABLET ORAL 3 TIMES DAILY PRN
COMMUNITY

## 2024-03-12 RX ORDER — GABAPENTIN 100 MG/1
100 CAPSULE ORAL 3 TIMES DAILY
COMMUNITY
Start: 2024-02-08

## 2024-03-12 ASSESSMENT — PATIENT HEALTH QUESTIONNAIRE - PHQ9
SUM OF ALL RESPONSES TO PHQ QUESTIONS 1-9: 0
SUM OF ALL RESPONSES TO PHQ QUESTIONS 1-9: 0
1. LITTLE INTEREST OR PLEASURE IN DOING THINGS: 0
SUM OF ALL RESPONSES TO PHQ QUESTIONS 1-9: 0
2. FEELING DOWN, DEPRESSED OR HOPELESS: 0
SUM OF ALL RESPONSES TO PHQ9 QUESTIONS 1 & 2: 0
SUM OF ALL RESPONSES TO PHQ QUESTIONS 1-9: 0

## 2024-03-12 NOTE — PROGRESS NOTES
Identified pt with two pt identifiers (name and ). Reviewed chart in preparation for visit and have obtained necessary documentation.    La Monaco is a 24 y.o. female  Chief Complaint   Patient presents with    New Patient     Seen at the request of María Luis NP for the evaluation of a possible umbilical hernia.      /67 (Site: Left Upper Arm, Position: Sitting, Cuff Size: Small Adult)   Pulse 82   Temp 98 °F (36.7 °C) (Oral)   Resp 16   Ht 1.702 m (5' 7\")   Wt 75.4 kg (166 lb 3.2 oz)   SpO2 95%   BMI 26.03 kg/m²     1. Have you been to the ER, urgent care clinic since your last visit?  Hospitalized since your last visit?no    2. Have you seen or consulted any other health care providers outside of the [facility] since your last visit?  Include any pap smears or colon screening. no   
°C) (Oral)   Resp 16   Ht 1.702 m (5' 7\")   Wt 75.4 kg (166 lb 3.2 oz)   SpO2 95%   BMI 26.03 kg/m²     Physical Exam:  General:  Alert, cooperative, no distress, appears stated age.   Eyes:  Conjunctivae/corneas clear.    Ears:  Normal external ear canals both ears.   Nose: Nares normal. Septum midline.    Mouth/Throat: Lips, mucosa, and tongue normal. Teeth and gums normal.   Neck: Supple, symmetrical, trachea midline   Lungs:   Clear to auscultation bilaterally. No respiratory distress   Heart:  Regular rate and rhythm   Abdomen:   Soft, non-tender. Bowel sounds normal. No masses,  No organomegaly. Small umbilical hernia    Extremities: Extremities normal, atraumatic, no cyanosis or edema.   Skin: Skin color, texture, turgor normal. No rashes or lesions          Assessment:      24 year old female with small reducible umbilical hernia     Plan:       I have recommended to La Monaco that we proceed with surgery    I had an extensive discussion with her regarding the risks, benefits, and alternatives of proceeding with a(n) Laparoscopic Ventral Hernia Repair with Mesh.  Risks,benefits, and alternatives were discussed including the risk of anesthesia, bleeding, infection, injury to bowel, chronic pain, and recurrence were discussed.    Patient wants to hold off on surgery and will call when she is ready to schedule.

## 2024-06-04 ENCOUNTER — TELEPHONE (OUTPATIENT)
Age: 24
End: 2024-06-04

## 2024-06-12 ENCOUNTER — PREP FOR PROCEDURE (OUTPATIENT)
Age: 24
End: 2024-06-12

## 2024-06-12 DIAGNOSIS — K43.9 VENTRAL HERNIA WITHOUT OBSTRUCTION OR GANGRENE: ICD-10-CM

## 2024-07-03 ENCOUNTER — HOSPITAL ENCOUNTER (EMERGENCY)
Facility: HOSPITAL | Age: 24
Discharge: HOME | End: 2024-07-03
Payer: COMMERCIAL

## 2024-07-03 VITALS
RESPIRATION RATE: 16 BRPM | WEIGHT: 163.14 LBS | OXYGEN SATURATION: 100 % | DIASTOLIC BLOOD PRESSURE: 82 MMHG | BODY MASS INDEX: 25.55 KG/M2 | SYSTOLIC BLOOD PRESSURE: 136 MMHG | TEMPERATURE: 97.9 F | HEART RATE: 96 BPM

## 2024-07-03 DIAGNOSIS — N30.01 ACUTE CYSTITIS WITH HEMATURIA: Primary | ICD-10-CM

## 2024-07-03 LAB
ALBUMIN SERPL-MCNC: 4.3 G/DL (ref 3.5–5)
ALBUMIN/GLOB SERPL: 1.1 (ref 1.1–2.2)
ALP SERPL-CCNC: 63 U/L (ref 45–117)
ALT SERPL-CCNC: 23 U/L (ref 12–78)
ANION GAP SERPL CALC-SCNC: 4 MMOL/L (ref 5–15)
APPEARANCE UR: ABNORMAL
AST SERPL-CCNC: 23 U/L (ref 15–37)
BACTERIA URNS QL MICRO: ABNORMAL /HPF
BASOPHILS # BLD: 0 K/UL (ref 0–0.1)
BASOPHILS NFR BLD: 0 % (ref 0–1)
BILIRUB SERPL-MCNC: 1.6 MG/DL (ref 0.2–1)
BILIRUB UR QL: NEGATIVE
BUN SERPL-MCNC: 11 MG/DL (ref 6–20)
BUN/CREAT SERPL: 13 (ref 12–20)
CALCIUM SERPL-MCNC: 9.5 MG/DL (ref 8.5–10.1)
CHLORIDE SERPL-SCNC: 107 MMOL/L (ref 97–108)
CO2 SERPL-SCNC: 27 MMOL/L (ref 21–32)
COLOR UR: ABNORMAL
CREAT SERPL-MCNC: 0.87 MG/DL (ref 0.55–1.02)
D DIMER PPP FEU-MCNC: <0.19 MG/L FEU (ref 0–0.65)
DIFFERENTIAL METHOD BLD: ABNORMAL
EOSINOPHIL # BLD: 0.1 K/UL (ref 0–0.4)
EOSINOPHIL NFR BLD: 1 % (ref 0–7)
EPITH CASTS URNS QL MICRO: ABNORMAL /LPF
ERYTHROCYTE [DISTWIDTH] IN BLOOD BY AUTOMATED COUNT: 13 % (ref 11.5–14.5)
GLOBULIN SER CALC-MCNC: 3.8 G/DL (ref 2–4)
GLUCOSE SERPL-MCNC: 82 MG/DL (ref 65–100)
GLUCOSE UR STRIP.AUTO-MCNC: NEGATIVE MG/DL
HCG UR QL: NEGATIVE
HCT VFR BLD AUTO: 42.3 % (ref 35–47)
HGB BLD-MCNC: 13.8 G/DL (ref 11.5–16)
HGB UR QL STRIP: ABNORMAL
IMM GRANULOCYTES # BLD AUTO: 0 K/UL (ref 0–0.04)
IMM GRANULOCYTES NFR BLD AUTO: 0 % (ref 0–0.5)
KETONES UR QL STRIP.AUTO: NEGATIVE MG/DL
LEUKOCYTE ESTERASE UR QL STRIP.AUTO: ABNORMAL
LYMPHOCYTES # BLD: 1.1 K/UL (ref 0.8–3.5)
LYMPHOCYTES NFR BLD: 12 % (ref 12–49)
MCH RBC QN AUTO: 28.6 PG (ref 26–34)
MCHC RBC AUTO-ENTMCNC: 32.6 G/DL (ref 30–36.5)
MCV RBC AUTO: 87.8 FL (ref 80–99)
MONOCYTES # BLD: 0.7 K/UL (ref 0–1)
MONOCYTES NFR BLD: 8 % (ref 5–13)
NEUTS SEG # BLD: 7.2 K/UL (ref 1.8–8)
NEUTS SEG NFR BLD: 79 % (ref 32–75)
NITRITE UR QL STRIP.AUTO: NEGATIVE
NRBC # BLD: 0 K/UL (ref 0–0.01)
NRBC BLD-RTO: 0 PER 100 WBC
PH UR STRIP: 6.5 (ref 5–8)
PLATELET # BLD AUTO: 264 K/UL (ref 150–400)
PMV BLD AUTO: 10.2 FL (ref 8.9–12.9)
POTASSIUM SERPL-SCNC: 4 MMOL/L (ref 3.5–5.1)
PROT SERPL-MCNC: 8.1 G/DL (ref 6.4–8.2)
PROT UR STRIP-MCNC: 300 MG/DL
RBC # BLD AUTO: 4.82 M/UL (ref 3.8–5.2)
RBC #/AREA URNS HPF: >100 /HPF (ref 0–5)
SODIUM SERPL-SCNC: 138 MMOL/L (ref 136–145)
SP GR UR REFRACTOMETRY: 1.01
TROPONIN I SERPL HS-MCNC: <4 NG/L (ref 0–51)
URINE CULTURE IF INDICATED: ABNORMAL
UROBILINOGEN UR QL STRIP.AUTO: 0.2 EU/DL (ref 0.2–1)
WBC # BLD AUTO: 9.1 K/UL (ref 3.6–11)
WBC URNS QL MICRO: ABNORMAL /HPF (ref 0–4)

## 2024-07-03 PROCEDURE — 99284 EMERGENCY DEPT VISIT MOD MDM: CPT

## 2024-07-03 PROCEDURE — 84484 ASSAY OF TROPONIN QUANT: CPT

## 2024-07-03 PROCEDURE — 80053 COMPREHEN METABOLIC PANEL: CPT

## 2024-07-03 PROCEDURE — 6360000002 HC RX W HCPCS: Performed by: PHYSICIAN ASSISTANT

## 2024-07-03 PROCEDURE — 6370000000 HC RX 637 (ALT 250 FOR IP): Performed by: PHYSICIAN ASSISTANT

## 2024-07-03 PROCEDURE — 85025 COMPLETE CBC W/AUTO DIFF WBC: CPT

## 2024-07-03 PROCEDURE — 96375 TX/PRO/DX INJ NEW DRUG ADDON: CPT

## 2024-07-03 PROCEDURE — 87086 URINE CULTURE/COLONY COUNT: CPT

## 2024-07-03 PROCEDURE — 85379 FIBRIN DEGRADATION QUANT: CPT

## 2024-07-03 PROCEDURE — 2580000003 HC RX 258: Performed by: PHYSICIAN ASSISTANT

## 2024-07-03 PROCEDURE — 36415 COLL VENOUS BLD VENIPUNCTURE: CPT

## 2024-07-03 PROCEDURE — 96374 THER/PROPH/DIAG INJ IV PUSH: CPT

## 2024-07-03 PROCEDURE — 81025 URINE PREGNANCY TEST: CPT

## 2024-07-03 PROCEDURE — 81001 URINALYSIS AUTO W/SCOPE: CPT

## 2024-07-03 RX ORDER — PHENAZOPYRIDINE HYDROCHLORIDE 100 MG/1
200 TABLET, FILM COATED ORAL
Status: COMPLETED | OUTPATIENT
Start: 2024-07-03 | End: 2024-07-03

## 2024-07-03 RX ORDER — PHENAZOPYRIDINE HYDROCHLORIDE 100 MG/1
100 TABLET, FILM COATED ORAL 3 TIMES DAILY PRN
Qty: 6 TABLET | Refills: 0 | Status: SHIPPED | OUTPATIENT
Start: 2024-07-03 | End: 2024-07-05

## 2024-07-03 RX ORDER — NITROFURANTOIN 25; 75 MG/1; MG/1
100 CAPSULE ORAL 2 TIMES DAILY
COMMUNITY
Start: 2024-07-03 | End: 2024-07-08

## 2024-07-03 RX ORDER — KETOROLAC TROMETHAMINE 30 MG/ML
30 INJECTION, SOLUTION INTRAMUSCULAR; INTRAVENOUS
Status: COMPLETED | OUTPATIENT
Start: 2024-07-03 | End: 2024-07-03

## 2024-07-03 RX ADMIN — KETOROLAC TROMETHAMINE 30 MG: 30 INJECTION, SOLUTION INTRAMUSCULAR at 11:14

## 2024-07-03 RX ADMIN — PHENAZOPYRIDINE 200 MG: 100 TABLET ORAL at 11:14

## 2024-07-03 RX ADMIN — CEFTRIAXONE SODIUM 1000 MG: 1 INJECTION, POWDER, FOR SOLUTION INTRAMUSCULAR; INTRAVENOUS at 12:31

## 2024-07-03 ASSESSMENT — PAIN DESCRIPTION - ORIENTATION: ORIENTATION: LOWER

## 2024-07-03 ASSESSMENT — PAIN DESCRIPTION - LOCATION: LOCATION: CHEST;PELVIS

## 2024-07-03 ASSESSMENT — PAIN - FUNCTIONAL ASSESSMENT: PAIN_FUNCTIONAL_ASSESSMENT: 0-10

## 2024-07-03 ASSESSMENT — PAIN DESCRIPTION - DESCRIPTORS: DESCRIPTORS: ACHING

## 2024-07-03 ASSESSMENT — PAIN SCALES - GENERAL: PAINLEVEL_OUTOF10: 8

## 2024-07-03 NOTE — ED PROVIDER NOTES
[facility] EMERGENCY DEPT  EMERGENCY DEPARTMENT ENCOUNTER       Pt Name: La Monaco  MRN: [id number]  Birthdate 2000  Date of evaluation: 7/3/2024  Provider: STANTON Angelo   PCP: Ayanna Austin MD  Note Started: 11:00 AM EDT 7/3/24     CHIEF COMPLAINT       Chief Complaint   Patient presents with    Chest Pain    Hematuria     Patient ambulatory into triage complaining of blood in the urine that began this morning and complaining of pain w/ urination. Patient also complaining of chest pain that began this morning and reports that it's worse with breathing. Patient denies cardiac hx. LMP ended June 24th.      HISTORY OF PRESENT ILLNESS: 1 or more elements      History From: Patient  HPI Limitations: None     La Monaco is a 24 y.o. female who presents by POV with urinary complaints. She reports several days of increased urinary frequency and dysuria. She was taking over the counter medications without relief. She awoke at 4 AM today and reports gross hematuria with associated chest pain when both urinating and taking a deep breath. She also reports suprapubic pressure. Denies fever, chills, nausea, vomiting, and chance of pregnancy. LNMP about a week and a half ago. Reports she is in a homosexual relationship.     She had a telehealth visit early this morning and an Rx for Macrobid was sent to her pharmacy but she has not had a chance to  the Rx yet.  Hematuria and pain worsened and she attempted to go the [facility] urgent care, who referred her to the ED for further evaluation.     Patient is a former smoker; quit 3 months ago.      Nursing Notes were all reviewed and agreed with or any disagreements were addressed in the HPI.     REVIEW OF SYSTEMS      Review of Systems     Positives and Pertinent negatives as per HPI.    PAST HISTORY     Past Medical History:  Past Medical History:   Diagnosis Date    Psychiatric disorder     depression and anxiety       Past Surgical History:  Past

## 2024-07-03 NOTE — DISCHARGE INSTRUCTIONS
Monocytes % 8 5 - 13 %    Eosinophils % 1 0 - 7 %    Basophils % 0 0 - 1 %    Immature Granulocytes % 0 0.0 - 0.5 %    Neutrophils Absolute 7.2 1.8 - 8.0 K/UL    Lymphocytes Absolute 1.1 0.8 - 3.5 K/UL    Monocytes Absolute 0.7 0.0 - 1.0 K/UL    Eosinophils Absolute 0.1 0.0 - 0.4 K/UL    Basophils Absolute 0.0 0.0 - 0.1 K/UL    Immature Granulocytes Absolute 0.0 0.00 - 0.04 K/UL    Differential Type AUTOMATED     Comprehensive Metabolic Panel    Collection Time: 07/03/24 11:17 AM   Result Value Ref Range    Sodium 138 136 - 145 mmol/L    Potassium 4.0 3.5 - 5.1 mmol/L    Chloride 107 97 - 108 mmol/L    CO2 27 21 - 32 mmol/L    Anion Gap 4 (L) 5 - 15 mmol/L    Glucose 82 65 - 100 mg/dL    BUN 11 6 - 20 MG/DL    Creatinine 0.87 0.55 - 1.02 MG/DL    BUN/Creatinine Ratio 13 12 - 20      Est, Glom Filt Rate >90 >60 ml/min/1.73m2    Calcium 9.5 8.5 - 10.1 MG/DL    Total Bilirubin 1.6 (H) 0.2 - 1.0 MG/DL    ALT 23 12 - 78 U/L    AST 23 15 - 37 U/L    Alk Phosphatase 63 45 - 117 U/L    Total Protein 8.1 6.4 - 8.2 g/dL    Albumin 4.3 3.5 - 5.0 g/dL    Globulin 3.8 2.0 - 4.0 g/dL    Albumin/Globulin Ratio 1.1 1.1 - 2.2     Troponin    Collection Time: 07/03/24 11:17 AM   Result Value Ref Range    Troponin, High Sensitivity <4 0 - 51 ng/L   D-Dimer, Quantitative    Collection Time: 07/03/24 11:17 AM   Result Value Ref Range    D-Dimer, Quant <0.19 0.00 - 0.65 mg/L FEU   Urinalysis with Reflex to Culture    Collection Time: 07/03/24 11:17 AM    Specimen: Urine   Result Value Ref Range    Color, UA RED      Appearance CLOUDY (A) CLEAR      Specific Gravity, UA 1.010      pH, Urine 6.5 5.0 - 8.0      Protein,  (A) NEG mg/dL    Glucose, Ur Negative NEG mg/dL    Ketones, Urine Negative NEG mg/dL    Bilirubin, Urine Negative NEG      Blood, Urine LARGE (A) NEG      Urobilinogen, Urine 0.2 0.2 - 1.0 EU/dL    Nitrite, Urine Negative NEG      Leukocyte Esterase, Urine MODERATE (A) NEG      WBC, UA 20-50 0 - 4 /hpf    RBC, UA

## 2024-07-04 LAB
BACTERIA SPEC CULT: ABNORMAL
CC UR VC: ABNORMAL
SERVICE CMNT-IMP: ABNORMAL

## 2024-07-07 LAB
EKG ATRIAL RATE: 90 BPM
EKG DIAGNOSIS: NORMAL
EKG P AXIS: 81 DEGREES
EKG P-R INTERVAL: 140 MS
EKG Q-T INTERVAL: 358 MS
EKG QRS DURATION: 98 MS
EKG QTC CALCULATION (BAZETT): 437 MS
EKG R AXIS: 71 DEGREES
EKG T AXIS: 60 DEGREES
EKG VENTRICULAR RATE: 90 BPM

## 2024-07-31 NOTE — PROGRESS NOTES
[facility]  Preoperative Instructions        Surgery Date 8/12/2024          Time of Arrival to be called [phone]     On the day of your surgery, please report to Surgical Services Registration Desk and sign in at your designated time.  The Surgery Center is located to the right of the Emergency Room.     2. You must have someone with you to drive you home. You should not drive a car for 24 hours following surgery. Please make arrangements for a friend or family member to stay with you for the first 24 hours after your surgery.    3. Do not have anything to eat or drink (including water, gum, mints, coffee, juice) after midnight ??      .?This may not apply to medications prescribed by your physician. ?(Please note below the special instructions with medications to take the morning of your procedure.)    4. We recommend you do not drink any alcoholic beverages for 24 hours before and after your surgery.    5. Contact your surgeon's office for instructions on the following medications: non-steroidal anti-inflammatory drugs (i.e. Advil, Aleve), vitamins, and supplements. (Some surgeon's will want you to stop these medications prior to surgery and others may allow you to take them)  **If you are currently taking Plavix, Coumadin, Aspirin and/or other blood-thinning agents, contact your surgeon for instructions.** Your surgeon will partner with the physician prescribing these medications to determine if it is safe to stop or if you need to continue taking.  Please do not stop taking these medications without instructions from your surgeon    6. Wear comfortable clothes.  Wear glasses instead of contacts.  Do not bring any money or jewelry. Please bring picture ID, insurance card, and any prearranged co-payment or hospital payment.  Do not wear make-up, particularly mascara the morning of your surgery.  Do not wear nail polish, particularly if you are having foot /hand surgery.  Wear your hair

## 2024-08-09 ENCOUNTER — ANESTHESIA EVENT (OUTPATIENT)
Facility: HOSPITAL | Age: 24
End: 2024-08-09
Payer: COMMERCIAL

## 2024-08-09 NOTE — ANESTHESIA PRE PROCEDURE
Department of Anesthesiology  Preprocedure Note       Name:  La Monaco   Age:  24 y.o.  :  2000                                          MRN:  [id number]         Date:  2024      Surgeon: Surgeon(s):  Nancy English MD    Procedure: Procedure(s):  ROBOTIC VENTRAL HERNIA REPAIR WITH MESH    Medications prior to admission:   Prior to Admission medications    Medication Sig Start Date End Date Taking? Authorizing Provider   cyclobenzaprine (FLEXERIL) 5 MG tablet Take 2 tablets by mouth 3 times daily as needed for Muscle spasms    ProviderViviane MD   Probiotic Product (PROBIOTIC PO) Take 1 tablet by mouth daily    Viviane Ibrahim MD   Multiple Vitamin (MULTIVITAMIN PO) Take 1 tablet by mouth daily    Automatic Reconciliation, Ar   sertraline (ZOLOFT) 100 MG tablet Take 1 tablet by mouth daily 20   Automatic Reconciliation, Ar       Current medications:    No current facility-administered medications for this encounter.     Current Outpatient Medications   Medication Sig Dispense Refill   • cyclobenzaprine (FLEXERIL) 5 MG tablet Take 2 tablets by mouth 3 times daily as needed for Muscle spasms     • Probiotic Product (PROBIOTIC PO) Take 1 tablet by mouth daily     • Multiple Vitamin (MULTIVITAMIN PO) Take 1 tablet by mouth daily     • sertraline (ZOLOFT) 100 MG tablet Take 1 tablet by mouth daily         Allergies:  No Known Allergies    Problem List:    Patient Active Problem List   Diagnosis Code   • Ventral hernia without obstruction or gangrene K43.9       Past Medical History:        Diagnosis Date   • Anxiety and depression        Past Surgical History:        Procedure Laterality Date   • TONSILLECTOMY     • WISDOM TOOTH EXTRACTION         Social History:    Social History     Tobacco Use   • Smoking status: Never   • Smokeless tobacco: Never   Substance Use Topics   • Alcohol use: Yes     Comment: socially                                Counseling given: Not

## 2024-08-12 ENCOUNTER — ANESTHESIA (OUTPATIENT)
Facility: HOSPITAL | Age: 24
End: 2024-08-12
Payer: COMMERCIAL

## 2024-08-12 ENCOUNTER — HOSPITAL ENCOUNTER (OUTPATIENT)
Facility: HOSPITAL | Age: 24
Setting detail: OUTPATIENT SURGERY
Discharge: HOME | End: 2024-08-12
Attending: SURGERY | Admitting: SURGERY
Payer: COMMERCIAL

## 2024-08-12 VITALS
HEIGHT: 66 IN | RESPIRATION RATE: 30 BRPM | WEIGHT: 167.33 LBS | SYSTOLIC BLOOD PRESSURE: 122 MMHG | OXYGEN SATURATION: 96 % | TEMPERATURE: 98.3 F | HEART RATE: 83 BPM | DIASTOLIC BLOOD PRESSURE: 68 MMHG | BODY MASS INDEX: 26.89 KG/M2

## 2024-08-12 DIAGNOSIS — K43.9 VENTRAL HERNIA WITHOUT OBSTRUCTION OR GANGRENE: Primary | ICD-10-CM

## 2024-08-12 LAB — HCG UR QL: NEGATIVE

## 2024-08-12 PROCEDURE — 7100000000 HC PACU RECOVERY - FIRST 15 MIN: Performed by: SURGERY

## 2024-08-12 PROCEDURE — 2580000003 HC RX 258: Performed by: ANESTHESIOLOGY

## 2024-08-12 PROCEDURE — 6360000002 HC RX W HCPCS: Performed by: SURGERY

## 2024-08-12 PROCEDURE — 2709999900 HC NON-CHARGEABLE SUPPLY: Performed by: SURGERY

## 2024-08-12 PROCEDURE — 6370000000 HC RX 637 (ALT 250 FOR IP): Performed by: ANESTHESIOLOGY

## 2024-08-12 PROCEDURE — 6360000002 HC RX W HCPCS: Performed by: NURSE ANESTHETIST, CERTIFIED REGISTERED

## 2024-08-12 PROCEDURE — 6360000002 HC RX W HCPCS: Performed by: ANESTHESIOLOGY

## 2024-08-12 PROCEDURE — 3600000019 HC SURGERY ROBOT ADDTL 15MIN: Performed by: SURGERY

## 2024-08-12 PROCEDURE — 2500000003 HC RX 250 WO HCPCS: Performed by: NURSE ANESTHETIST, CERTIFIED REGISTERED

## 2024-08-12 PROCEDURE — 2580000003 HC RX 258: Performed by: NURSE ANESTHETIST, CERTIFIED REGISTERED

## 2024-08-12 PROCEDURE — 49593 RPR AA HRN 1ST 3-10 RDC: CPT | Performed by: SURGERY

## 2024-08-12 PROCEDURE — 81025 URINE PREGNANCY TEST: CPT

## 2024-08-12 PROCEDURE — 2580000003 HC RX 258: Performed by: SURGERY

## 2024-08-12 PROCEDURE — C1781 MESH (IMPLANTABLE): HCPCS | Performed by: SURGERY

## 2024-08-12 PROCEDURE — 7100000001 HC PACU RECOVERY - ADDTL 15 MIN: Performed by: SURGERY

## 2024-08-12 PROCEDURE — 3600000009 HC SURGERY ROBOT BASE: Performed by: SURGERY

## 2024-08-12 PROCEDURE — 3700000000 HC ANESTHESIA ATTENDED CARE: Performed by: SURGERY

## 2024-08-12 PROCEDURE — 88302 TISSUE EXAM BY PATHOLOGIST: CPT

## 2024-08-12 PROCEDURE — S2900 ROBOTIC SURGICAL SYSTEM: HCPCS | Performed by: SURGERY

## 2024-08-12 PROCEDURE — 3700000001 HC ADD 15 MINUTES (ANESTHESIA): Performed by: SURGERY

## 2024-08-12 DEVICE — PATCH HERN L DIA3.2IN CIR W/ STRP SEPRA TECHNOLOGY ABSRB: Type: IMPLANTABLE DEVICE | Site: ABDOMEN | Status: FUNCTIONAL

## 2024-08-12 RX ORDER — LIDOCAINE HYDROCHLORIDE 10 MG/ML
1 INJECTION, SOLUTION EPIDURAL; INFILTRATION; INTRACAUDAL; PERINEURAL
Status: DISCONTINUED | OUTPATIENT
Start: 2024-08-12 | End: 2024-08-12 | Stop reason: HOSPADM

## 2024-08-12 RX ORDER — PROCHLORPERAZINE EDISYLATE 5 MG/ML
5 INJECTION INTRAMUSCULAR; INTRAVENOUS
Status: DISCONTINUED | OUTPATIENT
Start: 2024-08-12 | End: 2024-08-12 | Stop reason: HOSPADM

## 2024-08-12 RX ORDER — MIDAZOLAM HYDROCHLORIDE 1 MG/ML
INJECTION INTRAMUSCULAR; INTRAVENOUS PRN
Status: DISCONTINUED | OUTPATIENT
Start: 2024-08-12 | End: 2024-08-12 | Stop reason: SDUPTHER

## 2024-08-12 RX ORDER — SODIUM CHLORIDE 0.9 % (FLUSH) 0.9 %
5-40 SYRINGE (ML) INJECTION PRN
Status: DISCONTINUED | OUTPATIENT
Start: 2024-08-12 | End: 2024-08-12 | Stop reason: HOSPADM

## 2024-08-12 RX ORDER — NALOXONE HYDROCHLORIDE 0.4 MG/ML
INJECTION, SOLUTION INTRAMUSCULAR; INTRAVENOUS; SUBCUTANEOUS PRN
Status: DISCONTINUED | OUTPATIENT
Start: 2024-08-12 | End: 2024-08-12 | Stop reason: HOSPADM

## 2024-08-12 RX ORDER — IBUPROFEN 600 MG/1
600 TABLET ORAL EVERY 6 HOURS PRN
Qty: 30 TABLET | Refills: 0 | Status: SHIPPED | OUTPATIENT
Start: 2024-08-12

## 2024-08-12 RX ORDER — LIDOCAINE HYDROCHLORIDE 20 MG/ML
INJECTION, SOLUTION EPIDURAL; INFILTRATION; INTRACAUDAL; PERINEURAL PRN
Status: DISCONTINUED | OUTPATIENT
Start: 2024-08-12 | End: 2024-08-12 | Stop reason: SDUPTHER

## 2024-08-12 RX ORDER — SODIUM CHLORIDE, SODIUM LACTATE, POTASSIUM CHLORIDE, CALCIUM CHLORIDE 600; 310; 30; 20 MG/100ML; MG/100ML; MG/100ML; MG/100ML
INJECTION, SOLUTION INTRAVENOUS CONTINUOUS
Status: DISCONTINUED | OUTPATIENT
Start: 2024-08-12 | End: 2024-08-12 | Stop reason: HOSPADM

## 2024-08-12 RX ORDER — SODIUM CHLORIDE 9 MG/ML
INJECTION, SOLUTION INTRAVENOUS PRN
Status: DISCONTINUED | OUTPATIENT
Start: 2024-08-12 | End: 2024-08-12 | Stop reason: HOSPADM

## 2024-08-12 RX ORDER — FENTANYL CITRATE 50 UG/ML
25 INJECTION, SOLUTION INTRAMUSCULAR; INTRAVENOUS EVERY 5 MIN PRN
Status: COMPLETED | OUTPATIENT
Start: 2024-08-12 | End: 2024-08-12

## 2024-08-12 RX ORDER — HYDROMORPHONE HYDROCHLORIDE 1 MG/ML
0.5 INJECTION, SOLUTION INTRAMUSCULAR; INTRAVENOUS; SUBCUTANEOUS EVERY 5 MIN PRN
Status: DISCONTINUED | OUTPATIENT
Start: 2024-08-12 | End: 2024-08-12 | Stop reason: HOSPADM

## 2024-08-12 RX ORDER — OXYCODONE HYDROCHLORIDE 5 MG/1
5 TABLET ORAL
Status: COMPLETED | OUTPATIENT
Start: 2024-08-12 | End: 2024-08-12

## 2024-08-12 RX ORDER — ROCURONIUM BROMIDE 10 MG/ML
INJECTION, SOLUTION INTRAVENOUS PRN
Status: DISCONTINUED | OUTPATIENT
Start: 2024-08-12 | End: 2024-08-12 | Stop reason: SDUPTHER

## 2024-08-12 RX ORDER — KETOROLAC TROMETHAMINE 30 MG/ML
15 INJECTION, SOLUTION INTRAMUSCULAR; INTRAVENOUS
Status: COMPLETED | OUTPATIENT
Start: 2024-08-12 | End: 2024-08-12

## 2024-08-12 RX ORDER — PROPOFOL 10 MG/ML
INJECTION, EMULSION INTRAVENOUS PRN
Status: DISCONTINUED | OUTPATIENT
Start: 2024-08-12 | End: 2024-08-12 | Stop reason: SDUPTHER

## 2024-08-12 RX ORDER — SODIUM CHLORIDE, SODIUM LACTATE, POTASSIUM CHLORIDE, CALCIUM CHLORIDE 600; 310; 30; 20 MG/100ML; MG/100ML; MG/100ML; MG/100ML
INJECTION, SOLUTION INTRAVENOUS CONTINUOUS PRN
Status: DISCONTINUED | OUTPATIENT
Start: 2024-08-12 | End: 2024-08-12 | Stop reason: SDUPTHER

## 2024-08-12 RX ORDER — ONDANSETRON 2 MG/ML
INJECTION INTRAMUSCULAR; INTRAVENOUS PRN
Status: DISCONTINUED | OUTPATIENT
Start: 2024-08-12 | End: 2024-08-12 | Stop reason: SDUPTHER

## 2024-08-12 RX ORDER — OXYCODONE HYDROCHLORIDE 5 MG/1
5 TABLET ORAL EVERY 6 HOURS PRN
Qty: 12 TABLET | Refills: 0 | Status: SHIPPED | OUTPATIENT
Start: 2024-08-12 | End: 2024-08-15

## 2024-08-12 RX ORDER — HYDROMORPHONE HYDROCHLORIDE 2 MG/ML
INJECTION, SOLUTION INTRAMUSCULAR; INTRAVENOUS; SUBCUTANEOUS PRN
Status: DISCONTINUED | OUTPATIENT
Start: 2024-08-12 | End: 2024-08-12 | Stop reason: SDUPTHER

## 2024-08-12 RX ORDER — ACETAMINOPHEN 500 MG
1000 TABLET ORAL ONCE
Status: COMPLETED | OUTPATIENT
Start: 2024-08-12 | End: 2024-08-12

## 2024-08-12 RX ORDER — ONDANSETRON 2 MG/ML
4 INJECTION INTRAMUSCULAR; INTRAVENOUS
Status: COMPLETED | OUTPATIENT
Start: 2024-08-12 | End: 2024-08-12

## 2024-08-12 RX ORDER — SODIUM CHLORIDE 0.9 % (FLUSH) 0.9 %
5-40 SYRINGE (ML) INJECTION EVERY 12 HOURS SCHEDULED
Status: DISCONTINUED | OUTPATIENT
Start: 2024-08-12 | End: 2024-08-12 | Stop reason: HOSPADM

## 2024-08-12 RX ORDER — DEXAMETHASONE SODIUM PHOSPHATE 4 MG/ML
INJECTION, SOLUTION INTRA-ARTICULAR; INTRALESIONAL; INTRAMUSCULAR; INTRAVENOUS; SOFT TISSUE PRN
Status: DISCONTINUED | OUTPATIENT
Start: 2024-08-12 | End: 2024-08-12 | Stop reason: SDUPTHER

## 2024-08-12 RX ORDER — DEXMEDETOMIDINE HYDROCHLORIDE 100 UG/ML
INJECTION, SOLUTION INTRAVENOUS PRN
Status: DISCONTINUED | OUTPATIENT
Start: 2024-08-12 | End: 2024-08-12 | Stop reason: SDUPTHER

## 2024-08-12 RX ORDER — BUPIVACAINE HYDROCHLORIDE 5 MG/ML
INJECTION, SOLUTION PERINEURAL PRN
Status: DISCONTINUED | OUTPATIENT
Start: 2024-08-12 | End: 2024-08-12 | Stop reason: ALTCHOICE

## 2024-08-12 RX ADMIN — FENTANYL CITRATE 25 MCG: 50 INJECTION INTRAMUSCULAR; INTRAVENOUS at 12:34

## 2024-08-12 RX ADMIN — DEXAMETHASONE SODIUM PHOSPHATE 4 MG: 4 INJECTION INTRA-ARTICULAR; INTRALESIONAL; INTRAMUSCULAR; INTRAVENOUS; SOFT TISSUE at 11:15

## 2024-08-12 RX ADMIN — SODIUM CHLORIDE, POTASSIUM CHLORIDE, SODIUM LACTATE AND CALCIUM CHLORIDE: 600; 310; 30; 20 INJECTION, SOLUTION INTRAVENOUS at 11:04

## 2024-08-12 RX ADMIN — HYDROMORPHONE HYDROCHLORIDE 0.5 MG: 2 INJECTION INTRAMUSCULAR; INTRAVENOUS; SUBCUTANEOUS at 11:08

## 2024-08-12 RX ADMIN — WATER 2000 MG: 1 INJECTION INTRAMUSCULAR; INTRAVENOUS; SUBCUTANEOUS at 11:14

## 2024-08-12 RX ADMIN — FENTANYL CITRATE 25 MCG: 50 INJECTION INTRAMUSCULAR; INTRAVENOUS at 12:21

## 2024-08-12 RX ADMIN — DEXMEDETOMIDINE 4 MCG: 100 INJECTION, SOLUTION INTRAVENOUS at 12:00

## 2024-08-12 RX ADMIN — LIDOCAINE HYDROCHLORIDE 100 MG: 20 INJECTION, SOLUTION EPIDURAL; INFILTRATION; INTRACAUDAL; PERINEURAL at 11:08

## 2024-08-12 RX ADMIN — HYDROMORPHONE HYDROCHLORIDE 0.5 MG: 2 INJECTION INTRAMUSCULAR; INTRAVENOUS; SUBCUTANEOUS at 11:58

## 2024-08-12 RX ADMIN — ROCURONIUM BROMIDE 40 MG: 10 INJECTION INTRAVENOUS at 11:08

## 2024-08-12 RX ADMIN — OXYCODONE HYDROCHLORIDE 5 MG: 5 TABLET ORAL at 12:46

## 2024-08-12 RX ADMIN — FENTANYL CITRATE 25 MCG: 50 INJECTION INTRAMUSCULAR; INTRAVENOUS at 12:38

## 2024-08-12 RX ADMIN — SUGAMMADEX 200 MG: 100 INJECTION, SOLUTION INTRAVENOUS at 11:52

## 2024-08-12 RX ADMIN — ONDANSETRON 4 MG: 2 INJECTION INTRAMUSCULAR; INTRAVENOUS at 12:52

## 2024-08-12 RX ADMIN — MIDAZOLAM HYDROCHLORIDE 2 MG: 1 INJECTION, SOLUTION INTRAMUSCULAR; INTRAVENOUS at 11:02

## 2024-08-12 RX ADMIN — ONDANSETRON 4 MG: 2 INJECTION INTRAMUSCULAR; INTRAVENOUS at 11:15

## 2024-08-12 RX ADMIN — DEXMEDETOMIDINE 8 MCG: 100 INJECTION, SOLUTION INTRAVENOUS at 11:04

## 2024-08-12 RX ADMIN — SODIUM CHLORIDE, POTASSIUM CHLORIDE, SODIUM LACTATE AND CALCIUM CHLORIDE: 600; 310; 30; 20 INJECTION, SOLUTION INTRAVENOUS at 08:39

## 2024-08-12 RX ADMIN — HYDROMORPHONE HYDROCHLORIDE 0.5 MG: 2 INJECTION INTRAMUSCULAR; INTRAVENOUS; SUBCUTANEOUS at 11:30

## 2024-08-12 RX ADMIN — HYDROMORPHONE HYDROCHLORIDE 0.5 MG: 2 INJECTION INTRAMUSCULAR; INTRAVENOUS; SUBCUTANEOUS at 11:04

## 2024-08-12 RX ADMIN — KETOROLAC TROMETHAMINE 15 MG: 30 INJECTION, SOLUTION INTRAMUSCULAR at 12:29

## 2024-08-12 RX ADMIN — PROPOFOL 150 MG: 10 INJECTION, EMULSION INTRAVENOUS at 11:08

## 2024-08-12 RX ADMIN — FENTANYL CITRATE 25 MCG: 50 INJECTION INTRAMUSCULAR; INTRAVENOUS at 12:27

## 2024-08-12 RX ADMIN — DEXMEDETOMIDINE 4 MCG: 100 INJECTION, SOLUTION INTRAVENOUS at 12:08

## 2024-08-12 RX ADMIN — HYDROMORPHONE HYDROCHLORIDE 0.5 MG: 1 INJECTION, SOLUTION INTRAMUSCULAR; INTRAVENOUS; SUBCUTANEOUS at 12:42

## 2024-08-12 RX ADMIN — ACETAMINOPHEN 1000 MG: 500 TABLET ORAL at 08:44

## 2024-08-12 ASSESSMENT — PAIN DESCRIPTION - DESCRIPTORS
DESCRIPTORS: BURNING
DESCRIPTORS: CRAMPING
DESCRIPTORS: CRAMPING;BURNING
DESCRIPTORS: BURNING;CRAMPING
DESCRIPTORS: CRAMPING;BURNING
DESCRIPTORS: SORE

## 2024-08-12 ASSESSMENT — PAIN DESCRIPTION - ORIENTATION
ORIENTATION: MID
ORIENTATION: LOWER

## 2024-08-12 ASSESSMENT — PAIN DESCRIPTION - LOCATION
LOCATION: ABDOMEN

## 2024-08-12 ASSESSMENT — PAIN SCALES - GENERAL
PAINLEVEL_OUTOF10: 8
PAINLEVEL_OUTOF10: 6
PAINLEVEL_OUTOF10: 8
PAINLEVEL_OUTOF10: 8
PAINLEVEL_OUTOF10: 2
PAINLEVEL_OUTOF10: 8

## 2024-08-12 NOTE — FLOWSHEET NOTE
08/12/24 1256   Family Communication    Relationship to Patient Parent    Phone Number Alyssia (mom)   Family/Significant Other Update Called   Delivery Origin Nurse   Message Disposition Other (comment)   Update Given Yes   Family Communication   Family Update Message Other (comment)

## 2024-08-12 NOTE — FLOWSHEET NOTE
08/12/24 1330   Discharge Checklist   Ride and Caregiver Arranged Yes   Ride Caregiver Provider Alyssia (mother)   Phone Number for Ride/Caregiver [phone]   Responsible party will drive the patient home Yes   Special Appliances/Equipment Abdominal binder   Mobility at Departure Wheelchair   Discharged with Documented Belongings Yes   Departure Mode With parents   AVS Reviewed   AVS & discharge instructions reviewed with patient and/or representative? Yes  (Alyssia (mom) [phone])   Reviewed instructions with Patient;Other (name and relationship in comment)  (Alyssia (mom) [phone])   Level of Understanding Questions answered;Verbalized understanding

## 2024-08-12 NOTE — FLOWSHEET NOTE
08/12/24 1216   Handoff   Communication Given Periop Handoff/Relief   Handoff phase Phase I receiving   Handoff Given To Nikko DIEGO   Handoff Received From ed DIEGO & Michael PETERSEN   Handoff Communication Face to Face

## 2024-08-12 NOTE — ANESTHESIA POSTPROCEDURE EVALUATION
Department of Anesthesiology  Postprocedure Note    Patient: La Monaco  MRN: [id number]  YOB: 2000  Date of evaluation: 8/12/2024    Procedure Summary       Date: 08/12/24 Room / Location: [facility] MAIN [facility] M1 / [facility] MAIN OR    Anesthesia Start: 1102 Anesthesia Stop: 1211    Procedure: ROBOTIC VENTRAL HERNIA REPAIR WITH MESH (Abdomen) Diagnosis:       Ventral hernia without obstruction or gangrene      (Ventral hernia without obstruction or gangrene [K43.9])    Providers: Nancy English MD Responsible Provider: Clarence Garcia MD    Anesthesia Type: General ASA Status: 1            Anesthesia Type: General    Alexander Phase I: Alexander Score: 10    Alexander Phase II:      Anesthesia Post Evaluation    Patient location during evaluation: PACU  Patient participation: complete - patient participated  Level of consciousness: awake  Pain score: 0  Airway patency: patent  Nausea & Vomiting: no nausea and no vomiting  Cardiovascular status: hemodynamically stable  Respiratory status: acceptable  Hydration status: stable  Multimodal analgesia pain management approach  Pain management: adequate    No notable events documented.

## 2024-08-12 NOTE — DISCHARGE INSTRUCTIONS
film. This may loosen the film before  your wound is healed.   Protect the wound from prolonged exposure to sunlight or tanning lamps while the film is in  place.    If you have any questions or concerns about this product, please consult your doctor.  *Trademark [email or web address] 2002

## 2024-08-12 NOTE — H&P
Subjective:    8/12/24: Patient presents for surgery    3/12/24:   La Monaco is a 24 y.o. female who presents for evaluation of umbilical hernia. She has had it for a while. 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 Its uncomfortable. Tolerating a diet. Having bowel function. Smokes once every two weeks and is trying to quit. Currently has no kids but doesn't plan on having any kids.       Past Medical History:   Diagnosis Date    Anxiety and depression      Past Surgical History:   Procedure Laterality Date    TONSILLECTOMY      WISDOM TOOTH EXTRACTION       Social History     Socioeconomic History    Marital status: Single     Spouse name: None    Number of children: None    Years of education: None    Highest education level: None   Tobacco Use    Smoking status: Never    Smokeless tobacco: Never   Vaping Use    Vaping status: Never Used   Substance and Sexual Activity    Alcohol use: Yes     Comment: socially    Drug use: Yes     Frequency: 2.0 times per week     Types: Marijuana (Weed)     Comment: socially-last used 08/09/2024    Sexual activity: Defer     Current Facility-Administered Medications   Medication Dose Route Frequency Provider Last Rate Last Admin    ceFAZolin (ANCEF) 2,000 mg in sterile water 20 mL IV syringe  2,000 mg IntraVENous Once Nancy English MD        lactated ringers IV soln infusion   IntraVENous Continuous Travis Reddy MD        lidocaine PF 1 % injection 1 mL  1 mL IntraDERmal Once PRN Clarence Garcia MD        lactated ringers IV soln infusion   IntraVENous Continuous Clarence Garcia  mL/hr at 08/12/24 0839 New Bag at 08/12/24 0839    sodium chloride flush 0.9 % injection 5-40 mL  5-40 mL IntraVENous 2 times per day Clarence Garcia MD        sodium chloride flush 0.9 % injection 5-40 mL  5-40 mL IntraVENous PRN Clarence Garcia MD        0.9 % sodium chloride infusion   IntraVENous PRN Clarence Garcia MD

## 2024-08-12 NOTE — OP NOTE
reapproximated the fascia longitudinally using facial closure barbed suture.    This left a defect 3 cm in length. The mesh was inserted and secured to the anterior abdominal wall with 2-0 absorbable barbed suture.  Once this was completed, the repair was noted to be solid and complete with the mesh fixated to the abdominal wall securely.     Hemostasis was noted. The ports were removed sequentially under visualization and the pneumoperitoneum was allowed to escape. Skin incisions were closed with subcuticular Monocryl followed by Dermabond. The patient remained stable during my presence in the operating room.     Electronically signed by Nancy English MD on 8/12/2024 at 11:58 AM

## 2024-08-27 ENCOUNTER — OFFICE VISIT (OUTPATIENT)
Age: 24
End: 2024-08-27
Payer: COMMERCIAL

## 2024-08-27 VITALS
BODY MASS INDEX: 26.74 KG/M2 | HEIGHT: 66 IN | RESPIRATION RATE: 14 BRPM | TEMPERATURE: 97 F | SYSTOLIC BLOOD PRESSURE: 115 MMHG | OXYGEN SATURATION: 98 % | HEART RATE: 98 BPM | DIASTOLIC BLOOD PRESSURE: 75 MMHG | WEIGHT: 166.4 LBS

## 2024-08-27 DIAGNOSIS — K43.9 VENTRAL HERNIA WITHOUT OBSTRUCTION OR GANGRENE: Primary | ICD-10-CM

## 2024-08-27 PROCEDURE — 99212 OFFICE O/P EST SF 10 MIN: CPT | Performed by: SURGERY

## 2024-08-27 RX ORDER — ONDANSETRON 4 MG/1
4 TABLET, ORALLY DISINTEGRATING ORAL 3 TIMES DAILY PRN
Qty: 21 TABLET | Refills: 0 | Status: SHIPPED | OUTPATIENT
Start: 2024-08-27

## 2024-08-27 ASSESSMENT — PATIENT HEALTH QUESTIONNAIRE - PHQ9
SUM OF ALL RESPONSES TO PHQ QUESTIONS 1-9: 0
SUM OF ALL RESPONSES TO PHQ9 QUESTIONS 1 & 2: 0
SUM OF ALL RESPONSES TO PHQ QUESTIONS 1-9: 0
SUM OF ALL RESPONSES TO PHQ QUESTIONS 1-9: 0
2. FEELING DOWN, DEPRESSED OR HOPELESS: NOT AT ALL
1. LITTLE INTEREST OR PLEASURE IN DOING THINGS: NOT AT ALL
SUM OF ALL RESPONSES TO PHQ QUESTIONS 1-9: 0

## 2024-12-16 ENCOUNTER — HOSPITAL ENCOUNTER (EMERGENCY)
Facility: HOSPITAL | Age: 24
Discharge: HOME | End: 2024-12-16
Attending: STUDENT IN AN ORGANIZED HEALTH CARE EDUCATION/TRAINING PROGRAM
Payer: COMMERCIAL

## 2024-12-16 ENCOUNTER — HOSPITAL ENCOUNTER (EMERGENCY)
Facility: HOSPITAL | Age: 24
Discharge: HOME | End: 2024-12-19
Payer: COMMERCIAL

## 2024-12-16 VITALS
RESPIRATION RATE: 14 BRPM | TEMPERATURE: 98 F | WEIGHT: 175.93 LBS | DIASTOLIC BLOOD PRESSURE: 61 MMHG | BODY MASS INDEX: 27.61 KG/M2 | SYSTOLIC BLOOD PRESSURE: 119 MMHG | HEART RATE: 68 BPM | OXYGEN SATURATION: 100 % | HEIGHT: 67 IN

## 2024-12-16 DIAGNOSIS — S09.90XA CLOSED HEAD INJURY, INITIAL ENCOUNTER: ICD-10-CM

## 2024-12-16 DIAGNOSIS — R55 SYNCOPE AND COLLAPSE: Primary | ICD-10-CM

## 2024-12-16 LAB
ALBUMIN SERPL-MCNC: 3.8 G/DL (ref 3.5–5)
ALBUMIN/GLOB SERPL: 1.1 (ref 1.1–2.2)
ALP SERPL-CCNC: 60 U/L (ref 45–117)
ALT SERPL-CCNC: 14 U/L (ref 12–78)
ANION GAP SERPL CALC-SCNC: 5 MMOL/L (ref 2–12)
APPEARANCE UR: CLEAR
AST SERPL-CCNC: 13 U/L (ref 15–37)
BACTERIA URNS QL MICRO: NEGATIVE /HPF
BASOPHILS # BLD: 0 K/UL (ref 0–0.1)
BASOPHILS NFR BLD: 1 % (ref 0–1)
BILIRUB SERPL-MCNC: 0.8 MG/DL (ref 0.2–1)
BILIRUB UR QL: NEGATIVE
BUN SERPL-MCNC: 12 MG/DL (ref 6–20)
BUN/CREAT SERPL: 13 (ref 12–20)
CALCIUM SERPL-MCNC: 9.2 MG/DL (ref 8.5–10.1)
CHLORIDE SERPL-SCNC: 105 MMOL/L (ref 97–108)
CO2 SERPL-SCNC: 29 MMOL/L (ref 21–32)
COLOR UR: NORMAL
COMMENT:: NORMAL
CREAT SERPL-MCNC: 0.9 MG/DL (ref 0.55–1.02)
DIFFERENTIAL METHOD BLD: NORMAL
EKG ATRIAL RATE: 74 BPM
EKG DIAGNOSIS: NORMAL
EKG P AXIS: 56 DEGREES
EKG P-R INTERVAL: 160 MS
EKG Q-T INTERVAL: 372 MS
EKG QRS DURATION: 96 MS
EKG QTC CALCULATION (BAZETT): 412 MS
EKG R AXIS: 77 DEGREES
EKG T AXIS: 39 DEGREES
EKG VENTRICULAR RATE: 74 BPM
EOSINOPHIL # BLD: 0.1 K/UL (ref 0–0.4)
EOSINOPHIL NFR BLD: 1 % (ref 0–7)
EPITH CASTS URNS QL MICRO: NORMAL /LPF
ERYTHROCYTE [DISTWIDTH] IN BLOOD BY AUTOMATED COUNT: 12.9 % (ref 11.5–14.5)
GLOBULIN SER CALC-MCNC: 3.6 G/DL (ref 2–4)
GLUCOSE SERPL-MCNC: 111 MG/DL (ref 65–100)
GLUCOSE UR STRIP.AUTO-MCNC: NEGATIVE MG/DL
HCG UR QL: NEGATIVE
HCT VFR BLD AUTO: 38.2 % (ref 35–47)
HGB BLD-MCNC: 12.5 G/DL (ref 11.5–16)
HGB UR QL STRIP: NEGATIVE
HYALINE CASTS URNS QL MICRO: NORMAL /LPF (ref 0–5)
IMM GRANULOCYTES # BLD AUTO: 0 K/UL (ref 0–0.04)
IMM GRANULOCYTES NFR BLD AUTO: 0 % (ref 0–0.5)
KETONES UR QL STRIP.AUTO: NEGATIVE MG/DL
LEUKOCYTE ESTERASE UR QL STRIP.AUTO: NEGATIVE
LYMPHOCYTES # BLD: 1.4 K/UL (ref 0.8–3.5)
LYMPHOCYTES NFR BLD: 18 % (ref 12–49)
MCH RBC QN AUTO: 29.3 PG (ref 26–34)
MCHC RBC AUTO-ENTMCNC: 32.7 G/DL (ref 30–36.5)
MCV RBC AUTO: 89.7 FL (ref 80–99)
MONOCYTES # BLD: 0.6 K/UL (ref 0–1)
MONOCYTES NFR BLD: 7 % (ref 5–13)
NEUTS SEG # BLD: 5.5 K/UL (ref 1.8–8)
NEUTS SEG NFR BLD: 73 % (ref 32–75)
NITRITE UR QL STRIP.AUTO: NEGATIVE
NRBC # BLD: 0 K/UL (ref 0–0.01)
NRBC BLD-RTO: 0 PER 100 WBC
PH UR STRIP: 6 (ref 5–8)
PLATELET # BLD AUTO: 307 K/UL (ref 150–400)
PMV BLD AUTO: 10.2 FL (ref 8.9–12.9)
POTASSIUM SERPL-SCNC: 4.1 MMOL/L (ref 3.5–5.1)
PROT SERPL-MCNC: 7.4 G/DL (ref 6.4–8.2)
PROT UR STRIP-MCNC: NEGATIVE MG/DL
RBC # BLD AUTO: 4.26 M/UL (ref 3.8–5.2)
RBC #/AREA URNS HPF: NORMAL /HPF (ref 0–5)
SODIUM SERPL-SCNC: 139 MMOL/L (ref 136–145)
SP GR UR REFRACTOMETRY: 1.01 (ref 1–1.03)
SPECIMEN HOLD: NORMAL
SPECIMEN HOLD: NORMAL
UROBILINOGEN UR QL STRIP.AUTO: 0.2 EU/DL (ref 0.2–1)
WBC # BLD AUTO: 7.5 K/UL (ref 3.6–11)
WBC URNS QL MICRO: NORMAL /HPF (ref 0–4)

## 2024-12-16 PROCEDURE — 70450 CT HEAD/BRAIN W/O DYE: CPT

## 2024-12-16 PROCEDURE — 72125 CT NECK SPINE W/O DYE: CPT

## 2024-12-16 PROCEDURE — 81025 URINE PREGNANCY TEST: CPT

## 2024-12-16 PROCEDURE — 99284 EMERGENCY DEPT VISIT MOD MDM: CPT

## 2024-12-16 PROCEDURE — 81001 URINALYSIS AUTO W/SCOPE: CPT

## 2024-12-16 PROCEDURE — 36415 COLL VENOUS BLD VENIPUNCTURE: CPT

## 2024-12-16 PROCEDURE — 85025 COMPLETE CBC W/AUTO DIFF WBC: CPT

## 2024-12-16 PROCEDURE — 80053 COMPREHEN METABOLIC PANEL: CPT

## 2024-12-16 PROCEDURE — 93005 ELECTROCARDIOGRAM TRACING: CPT

## 2024-12-16 RX ORDER — CYCLOBENZAPRINE HCL 10 MG
10 TABLET ORAL 3 TIMES DAILY PRN
Qty: 21 TABLET | Refills: 0 | Status: SHIPPED | OUTPATIENT
Start: 2024-12-16 | End: 2024-12-26

## 2024-12-16 ASSESSMENT — PAIN DESCRIPTION - DESCRIPTORS
DESCRIPTORS: DISCOMFORT;ACHING
DESCRIPTORS: ACHING

## 2024-12-16 ASSESSMENT — PAIN DESCRIPTION - ORIENTATION
ORIENTATION: RIGHT
ORIENTATION: RIGHT

## 2024-12-16 ASSESSMENT — PAIN DESCRIPTION - LOCATION
LOCATION: HEAD
LOCATION: HEAD

## 2024-12-16 ASSESSMENT — PAIN - FUNCTIONAL ASSESSMENT
PAIN_FUNCTIONAL_ASSESSMENT: 0-10
PAIN_FUNCTIONAL_ASSESSMENT: ACTIVITIES ARE NOT PREVENTED
PAIN_FUNCTIONAL_ASSESSMENT: ACTIVITIES ARE NOT PREVENTED
PAIN_FUNCTIONAL_ASSESSMENT: 0-10

## 2024-12-16 ASSESSMENT — PAIN DESCRIPTION - ONSET
ONSET: ON-GOING
ONSET: ON-GOING

## 2024-12-16 ASSESSMENT — PAIN DESCRIPTION - PAIN TYPE
TYPE: ACUTE PAIN
TYPE: ACUTE PAIN

## 2024-12-16 ASSESSMENT — PAIN SCALES - GENERAL
PAINLEVEL_OUTOF10: 6
PAINLEVEL_OUTOF10: 3

## 2024-12-16 ASSESSMENT — PAIN DESCRIPTION - FREQUENCY
FREQUENCY: CONTINUOUS
FREQUENCY: CONTINUOUS

## 2024-12-16 NOTE — ED NOTES
I have reviewed discharge instructions & discussed discharge medications with the patient. Opportunity for questions & clarifications was provided. All questions & concerns were addressed. The patient verbalized understanding. She left ambulatory w/ family in stable condition, no acute distress noted.

## 2024-12-16 NOTE — ED TRIAGE NOTES
Patient reports she passed out and fell today face forward striking her head on the floor. History of same in the past. She usually has enough warning to lower herself to the floor but today it happened to fast. According to girlfriend she was unconscious for about 30 seconds. Patient awake alert and oriented upon arrival. No reported seizure activity by witness. Vomiting daily for last 2 weeks. Swelling over right eye.

## 2024-12-16 NOTE — ED NOTES
24 y.o. female with a history of anxiety, depression, eczema, syncope presents with syncope and head injury.  Has a history of syncopal episodes that are followed by her PCP.  Syncope typically is preceded by feelings of excitement or anxiety.  They were at a store checking out today when she experienced typical presyncope feelings, then passed out hitting her right forehead on the floor.  No tongue biting, incontinence, or shaking reported.  Endorses dizziness, head pain, and neck pain. No pain meds.   Endorses vomiting every morning for the last two weeks.Has been taking antacids.   No blood thinners. Has had some wine and edibles.   Denies family history of sudden cardiac death. No previous echo.       3:25 PM  I have evaluated the patient as the Provider in Rapid Medical Evaluation (RME). I have reviewed her vital signs and the triage nurse assessment. I have talked with the patient and any available family and advised that I am the provider in triage and have ordered the appropriate study to initiate their work up based on the clinical presentation during my assessment. I have advised that the patient will be accommodated in the Main ED as soon as possible. I have also requested to contact the triage nurse or myself immediately if the patient experiences any changes in their condition during this brief waiting period.  MARK Corbett NP, Kelly E, APRN - NP  12/16/24 1471

## 2025-02-06 ENCOUNTER — TELEPHONE (OUTPATIENT)
Age: 25
End: 2025-02-06

## 2025-02-06 NOTE — TELEPHONE ENCOUNTER
Pt arrived to [facility] for appt. VSS, no complaints at this time. Medication administered per MD order.  Pt tolerated well. VSS post infusion. Verified pt had next scheduled appt. AVS offered, pt refused. Pt discharged from [facility] at 12:01 PM in stable condition, without complaints.    called to schedule fu for abd pain after VHR on 8.12.24 per patient appt request via [employer], had to lm    10

## 2025-02-25 ENCOUNTER — OFFICE VISIT (OUTPATIENT)
Age: 25
End: 2025-02-25
Payer: COMMERCIAL

## 2025-02-25 VITALS
RESPIRATION RATE: 18 BRPM | HEIGHT: 67 IN | WEIGHT: 186.2 LBS | HEART RATE: 79 BPM | OXYGEN SATURATION: 97 % | SYSTOLIC BLOOD PRESSURE: 122 MMHG | DIASTOLIC BLOOD PRESSURE: 75 MMHG | BODY MASS INDEX: 29.22 KG/M2 | TEMPERATURE: 98.8 F

## 2025-02-25 DIAGNOSIS — R10.10 PAIN OF UPPER ABDOMEN: Primary | ICD-10-CM

## 2025-02-25 PROCEDURE — 99213 OFFICE O/P EST LOW 20 MIN: CPT | Performed by: SURGERY

## 2025-02-25 ASSESSMENT — PATIENT HEALTH QUESTIONNAIRE - PHQ9
SUM OF ALL RESPONSES TO PHQ QUESTIONS 1-9: 0
1. LITTLE INTEREST OR PLEASURE IN DOING THINGS: NOT AT ALL
SUM OF ALL RESPONSES TO PHQ QUESTIONS 1-9: 0
SUM OF ALL RESPONSES TO PHQ QUESTIONS 1-9: 0
2. FEELING DOWN, DEPRESSED OR HOPELESS: NOT AT ALL
SUM OF ALL RESPONSES TO PHQ9 QUESTIONS 1 & 2: 0
SUM OF ALL RESPONSES TO PHQ QUESTIONS 1-9: 0

## 2025-02-25 NOTE — PROGRESS NOTES
Identified pt with two pt identifiers (name and ). Reviewed chart in preparation for visit and have obtained necessary documentation.    La Monaco is a 25 y.o. female Abdominal Pain (Surgery for VHR on 24)  .    Vitals:    25 0941   BP: 122/75   Site: Right Upper Arm   Position: Sitting   Cuff Size: Medium Adult   Pulse: 79   Resp: 18   Temp: 98.8 °F (37.1 °C)   TempSrc: Oral   SpO2: 97%   Weight: 84.5 kg (186 lb 3.2 oz)   Height: 1.702 m (5' 7\")          1. Have you been to the ER, urgent care clinic since your last visit?  Hospitalized since your last visit?  no     2. Have you seen or consulted any other health care providers outside of the [facility] since your last visit?  Include any pap smears or colon screening.  yes - [facility] on 25

## 2025-02-25 NOTE — PROGRESS NOTES
Subjective:       La Monaco is a 25 y.o. female who presents for evaluation of abdominal pain. She got gut punched with a dog at the end of jan. She had some rest. She took some NSAIDS/tylenol. She still has some discomfort.  She had a robotic VHR with mesh on 2/25/25. Tolerating a diet and having bowel function      Past Medical History:   Diagnosis Date    Anxiety and depression      Past Surgical History:   Procedure Laterality Date    HERNIA REPAIR  8/12/24    TONSILLECTOMY      VENTRAL HERNIA REPAIR N/A 08/12/2024    ROBOTIC VENTRAL HERNIA REPAIR WITH MESH performed by Nancy English MD at [facility] MAIN OR    WISDOM TOOTH EXTRACTION       Social History     Socioeconomic History    Marital status: Single     Spouse name: None    Number of children: None    Years of education: None    Highest education level: None   Tobacco Use    Smoking status: Never    Smokeless tobacco: Never   Vaping Use    Vaping status: Never Used   Substance and Sexual Activity    Alcohol use: Yes     Alcohol/week: 2.0 standard drinks of alcohol     Types: 2 Glasses of wine per week     Comment: socially    Drug use: Yes     Frequency: 2.0 times per week     Types: Marijuana (Weed)     Comment: socially-last used 08/09/2024    Sexual activity: Yes     Partners: Female     Current Outpatient Medications   Medication Sig Dispense Refill    ondansetron (ZOFRAN-ODT) 4 MG disintegrating tablet Take 1 tablet by mouth 3 times daily as needed for Nausea or Vomiting 21 tablet 0    ibuprofen (ADVIL;MOTRIN) 600 MG tablet Take 1 tablet by mouth every 6 hours as needed for Pain 30 tablet 0    Probiotic Product (PROBIOTIC PO) Take 1 tablet by mouth daily      Multiple Vitamin (MULTIVITAMIN PO) Take 1 tablet by mouth daily      sertraline (ZOLOFT) 100 MG tablet Take 1 tablet by mouth daily       No current facility-administered medications for this visit.     No Known Allergies    Review of Systems  Pertinent items are noted in HPI.

## 2025-03-02 ENCOUNTER — HOSPITAL ENCOUNTER (EMERGENCY)
Facility: HOSPITAL | Age: 25
Discharge: HOME | End: 2025-03-02
Payer: COMMERCIAL

## 2025-03-02 ENCOUNTER — APPOINTMENT (OUTPATIENT)
Facility: HOSPITAL | Age: 25
End: 2025-03-02
Payer: COMMERCIAL

## 2025-03-02 VITALS
RESPIRATION RATE: 18 BRPM | HEIGHT: 67 IN | DIASTOLIC BLOOD PRESSURE: 74 MMHG | OXYGEN SATURATION: 100 % | BODY MASS INDEX: 29.24 KG/M2 | WEIGHT: 186.29 LBS | TEMPERATURE: 97.8 F | SYSTOLIC BLOOD PRESSURE: 149 MMHG | HEART RATE: 84 BPM
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
## 2025-03-02 PROCEDURE — 73140 X-RAY EXAM OF FINGER(S): CPT

## 2025-03-02 PROCEDURE — 6360000002 HC RX W HCPCS: Performed by: PHYSICIAN ASSISTANT

## 2025-03-02 PROCEDURE — 90471 IMMUNIZATION ADMIN: CPT | Performed by: PHYSICIAN ASSISTANT

## 2025-03-02 PROCEDURE — 90375 RABIES IG IM/SC: CPT | Performed by: PHYSICIAN ASSISTANT

## 2025-03-02 PROCEDURE — 6370000000 HC RX 637 (ALT 250 FOR IP): Performed by: PHYSICIAN ASSISTANT

## 2025-03-02 PROCEDURE — 90675 RABIES VACCINE IM: CPT | Performed by: PHYSICIAN ASSISTANT

## 2025-03-02 PROCEDURE — 96372 THER/PROPH/DIAG INJ SC/IM: CPT

## 2025-03-02 PROCEDURE — 99284 EMERGENCY DEPT VISIT MOD MDM: CPT

## 2025-03-02 RX ADMIN — RABIES VACCINE 1 ML: KIT at 13:27

## 2025-03-02 RX ADMIN — AMOXICILLIN AND CLAVULANATE POTASSIUM 1 TABLET: 875; 125 TABLET, FILM COATED ORAL at 13:27

## 2025-03-02 RX ADMIN — RABIES IMMUNE GLOBULIN (HUMAN) 1695 UNITS: 300 INJECTION, SOLUTION INFILTRATION; INTRAMUSCULAR at 13:27

## 2025-03-02 ASSESSMENT — PAIN - FUNCTIONAL ASSESSMENT: PAIN_FUNCTIONAL_ASSESSMENT: 0-10

## 2025-03-02 ASSESSMENT — PAIN DESCRIPTION - LOCATION: LOCATION: FINGER (COMMENT WHICH ONE)

## 2025-03-02 ASSESSMENT — LIFESTYLE VARIABLES
HOW MANY STANDARD DRINKS CONTAINING ALCOHOL DO YOU HAVE ON A TYPICAL DAY: PATIENT DOES NOT DRINK
HOW OFTEN DO YOU HAVE A DRINK CONTAINING ALCOHOL: NEVER

## 2025-03-02 ASSESSMENT — PAIN SCALES - GENERAL: PAINLEVEL_OUTOF10: 5

## 2025-03-02 ASSESSMENT — PAIN DESCRIPTION - DESCRIPTORS: DESCRIPTORS: ACHING

## 2025-03-02 ASSESSMENT — PAIN DESCRIPTION - ORIENTATION: ORIENTATION: RIGHT

## 2025-03-02 NOTE — ED PROVIDER NOTES
[facility] EMERGENCY DEPARTMENT  EMERGENCY DEPARTMENT ENCOUNTER       Pt Name: La Monaco  MRN: [id number]  Birthdate 2000  Date of Evaluation: 3/2/2025  Provider: Charlene Gordon PA-C   PCP: Ayanna Austin MD  Note Started: 1:39 PM 3/2/25     CHIEF COMPLAINT       Chief Complaint   Patient presents with    Animal Bite     Patient ambulatory to triage complaining of a cat bite to the R index finger that happened a couple days ago. Patient reports cat was an indoor cat and was not up to date on vaccinations. Patient reports that she has numbness to the tip of the finger.        HISTORY OF PRESENT ILLNESS: 1 or more elements      History From: Patient  None     La Monaco is a 25 y.o. female who presents to the ED today cat bite to right index finger.  Happened a couple days ago.  Cat was not up-to-date on its rabies.  This happened while she was at work.  Patient initially went to an urgent care.  She was not started on antibiotics.  She was not started on the rabies series.  Patient states that she has had worsening discomfort.  Fearful of rabies since the cat was not up-to-date on its rabies.  Presents here for further evaluation     Nursing Notes were all reviewed and agreed with or any disagreements were addressed in the HPI.     REVIEW OF SYSTEMS      Review of Systems     Positives and Pertinent negatives as per HPI.    PAST HISTORY     Past Medical History:  Past Medical History:   Diagnosis Date    Anxiety and depression        Past Surgical History:  Past Surgical History:   Procedure Laterality Date    HERNIA REPAIR  8/12/24    TONSILLECTOMY      VENTRAL HERNIA REPAIR N/A 08/12/2024    ROBOTIC VENTRAL HERNIA REPAIR WITH MESH performed by Nancy English MD at [facility] MAIN OR    WISDOM TOOTH EXTRACTION         Family History:  Family History   Problem Relation Age of Onset    No Known Problems Mother     No Known Problems Father     Ovarian Cancer Maternal Grandmother

## 2025-03-03 NOTE — CARE COORDINATION
3/3/2025 @ [address] [place] [address], faxed [facility] copy of script and order to call pt and schedule follow pt injections.    Brenda Throckmorton RN  [facility] Case Management  /[phone]

## 2025-03-04 PROBLEM — Z91.89: Status: ACTIVE | Noted: 2025-03-04

## 2025-03-05 ENCOUNTER — HOSPITAL ENCOUNTER (OUTPATIENT)
Facility: HOSPITAL | Age: 25
Setting detail: INFUSION SERIES
Discharge: HOME | End: 2025-03-05
Payer: COMMERCIAL

## 2025-03-05 VITALS
WEIGHT: 182.9 LBS | HEIGHT: 67 IN | HEART RATE: 80 BPM | BODY MASS INDEX: 28.71 KG/M2 | RESPIRATION RATE: 16 BRPM | TEMPERATURE: 99 F | OXYGEN SATURATION: 98 % | SYSTOLIC BLOOD PRESSURE: 126 MMHG | DIASTOLIC BLOOD PRESSURE: 79 MMHG

## 2025-03-05 DIAGNOSIS — Z91.89 AT INCREASED RISK FOR EXPOSURE TO RABIES VIRUS: Primary | ICD-10-CM

## 2025-03-05 PROCEDURE — 90675 RABIES VACCINE IM: CPT | Performed by: PHYSICIAN ASSISTANT

## 2025-03-05 PROCEDURE — 6360000002 HC RX W HCPCS: Performed by: PHYSICIAN ASSISTANT

## 2025-03-05 PROCEDURE — 90471 IMMUNIZATION ADMIN: CPT | Performed by: PHYSICIAN ASSISTANT

## 2025-03-05 RX ADMIN — RABIES VACCINE 1 ML: KIT at 16:01

## 2025-03-05 ASSESSMENT — PAIN DESCRIPTION - LOCATION: LOCATION: FINGER (COMMENT WHICH ONE)

## 2025-03-05 ASSESSMENT — PAIN DESCRIPTION - ORIENTATION: ORIENTATION: RIGHT

## 2025-03-05 ASSESSMENT — PAIN DESCRIPTION - DESCRIPTORS: DESCRIPTORS: SORE

## 2025-03-05 ASSESSMENT — PAIN SCALES - GENERAL: PAINLEVEL_OUTOF10: 2

## 2025-03-05 NOTE — PROGRESS NOTES
[facility] Visit Note:    1554 Pt arrived to [facility] ambulatory and in no distress for Rabies vaccine Day 3.  Assessment completed, no new complaints voiced. Patient is a  and was bit by a cat on her right index finger while at work. Pain is minimal, just sore.      /79   Pulse 80   Temp 99 °F (37.2 °C) (Temporal)   Resp 16   Ht 1.702 m (5' 7\")   Wt 83 kg (182 lb 14.4 oz)   SpO2 98%   BMI 28.65 kg/m²     Medications Administered         rabies vaccine, PCEC (RABAVERT) injection 1 mL Admin Date  03/05/2025 Action  Given Dose  1 mL Route  IntraMUSCular  Left Deltoid Documented By  Perlita Denton, RN           1608 Tolerated treatment well, no adverse reaction noted.  D/Cd from [facility] ambulatory and in no distress accompanied by self.  Next appt 3/9/25 @ 0900.

## 2025-03-09 ENCOUNTER — HOSPITAL ENCOUNTER (OUTPATIENT)
Facility: HOSPITAL | Age: 25
Setting detail: INFUSION SERIES
Discharge: HOME | End: 2025-03-09
Payer: COMMERCIAL

## 2025-03-09 VITALS
OXYGEN SATURATION: 100 % | SYSTOLIC BLOOD PRESSURE: 117 MMHG | DIASTOLIC BLOOD PRESSURE: 46 MMHG | TEMPERATURE: 98 F | HEART RATE: 69 BPM | RESPIRATION RATE: 16 BRPM

## 2025-03-09 DIAGNOSIS — Z91.89 AT INCREASED RISK FOR EXPOSURE TO RABIES VIRUS: Primary | ICD-10-CM

## 2025-03-09 PROCEDURE — 96372 THER/PROPH/DIAG INJ SC/IM: CPT

## 2025-03-09 PROCEDURE — 6360000002 HC RX W HCPCS: Performed by: PHYSICIAN ASSISTANT

## 2025-03-09 PROCEDURE — 90471 IMMUNIZATION ADMIN: CPT | Performed by: PHYSICIAN ASSISTANT

## 2025-03-09 PROCEDURE — 90675 RABIES VACCINE IM: CPT | Performed by: PHYSICIAN ASSISTANT

## 2025-03-09 RX ADMIN — RABIES VACCINE 1 ML: KIT at 09:39

## 2025-03-09 ASSESSMENT — PAIN SCALES - GENERAL: PAINLEVEL_OUTOF10: 0

## 2025-03-09 NOTE — PROGRESS NOTES
[facility] Progress Note    Date: 2025    Name: La Monaco    MRN: [id number]         : 2000    Ms. Monaco Arrived ambulatory and in no distress for Rabies Injection.  Assessment was completed, no acute issues at this time, no new complaints voiced.        Ms. Monaco's vitals were reviewed.  Vitals:    25 0930   BP: (!) 117/46   Pulse: 69   Resp: 16   Temp: 98 °F (36.7 °C)   SpO2: 100%         Medications:  Medications Administered         rabies vaccine, PCEC (RABAVERT) injection 1 mL Admin Date  2025 Action  Given Dose  1 mL Route  IntraMUSCular Documented By  Ailyn Ornelas RN          Administered in left arm.    Ms. Monaco tolerated treatment well and was [facility] in stable condition.   Patient is aware of future appointments.    Future Appointments   Date Time Provider Department Center   3/16/2025  9:00 [facility]       Ailyn Ornelas RN  2025

## 2025-03-16 ENCOUNTER — HOSPITAL ENCOUNTER (OUTPATIENT)
Facility: HOSPITAL | Age: 25
Setting detail: INFUSION SERIES
Discharge: HOME | End: 2025-03-16
Payer: COMMERCIAL

## 2025-03-16 VITALS
SYSTOLIC BLOOD PRESSURE: 105 MMHG | RESPIRATION RATE: 18 BRPM | HEART RATE: 67 BPM | TEMPERATURE: 98.7 F | DIASTOLIC BLOOD PRESSURE: 54 MMHG | OXYGEN SATURATION: 100 %

## 2025-03-16 DIAGNOSIS — Z91.89 AT INCREASED RISK FOR EXPOSURE TO RABIES VIRUS: Primary | ICD-10-CM

## 2025-03-16 PROCEDURE — 90675 RABIES VACCINE IM: CPT | Performed by: PHYSICIAN ASSISTANT

## 2025-03-16 PROCEDURE — 90471 IMMUNIZATION ADMIN: CPT

## 2025-03-16 PROCEDURE — 6360000002 HC RX W HCPCS: Performed by: PHYSICIAN ASSISTANT

## 2025-03-16 PROCEDURE — 90471 IMMUNIZATION ADMIN: CPT | Performed by: PHYSICIAN ASSISTANT

## 2025-03-16 RX ADMIN — RABIES VACCINE 1 ML: KIT at 09:16

## 2025-03-16 ASSESSMENT — PAIN SCALES - GENERAL: PAINLEVEL_OUTOF10: 0

## 2025-03-16 NOTE — PROGRESS NOTES
[facility] Visit Note    0915  Pt arrived at [facility] ambulatory and in no distress for Rabies vaccine day 14.  Assessment completed, no new complaints voiced.      Patient denied having any symptoms of COVID-19, i.e. SOB, coughing, fever, or generally not feeling well.      BP (!) 105/54   Pulse 67   Temp 98.7 °F (37.1 °C) (Temporal)   Resp 18   SpO2 100%     Medications Administered         rabies vaccine, PCEC (RABAVERT) injection 1 mL Admin Date  03/16/2025 Action  Given Dose  1 mL Route  IntraMUSCular Documented By  Leana Hernandez, RN             Patient tolerated treatment well, no adverse reaction noted.  D/Cd from [facility] ambulatory and in no distress.

## (undated) DEVICE — BLADELESS OBTURATOR: Brand: WECK VISTA

## (undated) DEVICE — TROCAR LAP L100MM DIA5MM BLDELSS W/ STBL SL ENDOPATH XCEL

## (undated) DEVICE — STERILE POLYISOPRENE POWDER-FREE SURGICAL GLOVES: Brand: PROTEXIS

## (undated) DEVICE — SEAL

## (undated) DEVICE — KIT,1200CC CANISTER,3/16"X6' TUBING: Brand: MEDLINE INDUSTRIES, INC.

## (undated) DEVICE — LIQUIBAND RAPID ADHESIVE 36/CS 0.8ML: Brand: MEDLINE

## (undated) DEVICE — TIP COVER ACCESSORY

## (undated) DEVICE — SOLUTION ANTIFOG VIS SYS CLEARIFY LAPSCP

## (undated) DEVICE — SUTURE 1 STRATAFIX SYMMETRIC PDS + 30CM CT-1 SXPP1A435

## (undated) DEVICE — COVER,MAYO STAND,STERILE: Brand: MEDLINE

## (undated) DEVICE — BINDER ABD M/L H12IN FOR 46-62IN WHT 4 SLD PNL DSGN HOOP

## (undated) DEVICE — SUTURE MONOCRYL SZ 4-0 L27IN ABSRB UD L19MM PS-2 1/2 CIR PRIM Y426H

## (undated) DEVICE — GOWN,SIRUS,NONRNF,SETINSLV,XL,20/CS: Brand: MEDLINE

## (undated) DEVICE — SUTURE STRATAFIX SPRL PDS + SZ 2-0 L6IN ABSRB VLT L36MM SXPP1B409

## (undated) DEVICE — HYPODERMIC SAFETY NEEDLE: Brand: MAGELLAN

## (undated) DEVICE — SYRINGE MED 10ML LUERLOCK TIP W/O SFTY DISP

## (undated) DEVICE — GENERAL LAPAROSCOPY-MRMC: Brand: MEDLINE INDUSTRIES, INC.

## (undated) DEVICE — 3M™ IOBAN™ 2 ANTIMICROBIAL INCISE DRAPE 6651EZ: Brand: IOBAN™ 2

## (undated) DEVICE — ARM DRAPE